# Patient Record
Sex: FEMALE | Race: WHITE | NOT HISPANIC OR LATINO | URBAN - METROPOLITAN AREA
[De-identification: names, ages, dates, MRNs, and addresses within clinical notes are randomized per-mention and may not be internally consistent; named-entity substitution may affect disease eponyms.]

---

## 2017-10-30 ENCOUNTER — INPATIENT (INPATIENT)
Facility: HOSPITAL | Age: 32
LOS: 1 days | Discharge: HOME | DRG: 310 | End: 2017-11-01
Attending: FAMILY MEDICINE | Admitting: FAMILY MEDICINE
Payer: COMMERCIAL

## 2017-10-30 VITALS
HEART RATE: 126 BPM | SYSTOLIC BLOOD PRESSURE: 163 MMHG | RESPIRATION RATE: 16 BRPM | TEMPERATURE: 99 F | DIASTOLIC BLOOD PRESSURE: 94 MMHG | OXYGEN SATURATION: 99 % | WEIGHT: 134.92 LBS

## 2017-10-30 DIAGNOSIS — R00.2 PALPITATIONS: ICD-10-CM

## 2017-10-30 DIAGNOSIS — R00.0 TACHYCARDIA, UNSPECIFIED: ICD-10-CM

## 2017-10-30 DIAGNOSIS — R53.1 WEAKNESS: ICD-10-CM

## 2017-10-30 DIAGNOSIS — Z29.9 ENCOUNTER FOR PROPHYLACTIC MEASURES, UNSPECIFIED: ICD-10-CM

## 2017-10-30 LAB
ALBUMIN SERPL ELPH-MCNC: 4.2 G/DL — SIGNIFICANT CHANGE UP (ref 3.3–5)
ALP SERPL-CCNC: 68 U/L — SIGNIFICANT CHANGE UP (ref 40–120)
ALT FLD-CCNC: 30 U/L — SIGNIFICANT CHANGE UP (ref 12–78)
ANION GAP SERPL CALC-SCNC: 11 MMOL/L — SIGNIFICANT CHANGE UP (ref 5–17)
APPEARANCE UR: ABNORMAL
APTT BLD: 26.2 SEC — SIGNIFICANT CHANGE UP (ref 27.5–37.4)
AST SERPL-CCNC: 16 U/L — SIGNIFICANT CHANGE UP (ref 15–37)
BACTERIA # UR AUTO: ABNORMAL
BASOPHILS # BLD AUTO: 0.1 K/UL — SIGNIFICANT CHANGE UP (ref 0–0.2)
BASOPHILS NFR BLD AUTO: 1.2 % — SIGNIFICANT CHANGE UP (ref 0–2)
BILIRUB SERPL-MCNC: 0.2 MG/DL — SIGNIFICANT CHANGE UP (ref 0.2–1.2)
BILIRUB UR-MCNC: NEGATIVE — SIGNIFICANT CHANGE UP
BUN SERPL-MCNC: 11 MG/DL — SIGNIFICANT CHANGE UP (ref 7–23)
CALCIUM SERPL-MCNC: 9.4 MG/DL — SIGNIFICANT CHANGE UP (ref 8.5–10.1)
CHLORIDE SERPL-SCNC: 105 MMOL/L — SIGNIFICANT CHANGE UP (ref 96–108)
CK MB BLD-MCNC: <0.7 % — SIGNIFICANT CHANGE UP (ref 0–3.5)
CK MB CFR SERPL CALC: <0.5 NG/ML — SIGNIFICANT CHANGE UP (ref 0–3.6)
CK SERPL-CCNC: 67 U/L — SIGNIFICANT CHANGE UP (ref 26–192)
CO2 SERPL-SCNC: 25 MMOL/L — SIGNIFICANT CHANGE UP (ref 22–31)
COLOR SPEC: YELLOW — SIGNIFICANT CHANGE UP
COMMENT - URINE: SIGNIFICANT CHANGE UP
CREAT SERPL-MCNC: 0.88 MG/DL — SIGNIFICANT CHANGE UP (ref 0.5–1.3)
D DIMER BLD IA.RAPID-MCNC: <150 NG/ML DDU — SIGNIFICANT CHANGE UP
DIFF PNL FLD: ABNORMAL
EOSINOPHIL # BLD AUTO: 0.1 K/UL — SIGNIFICANT CHANGE UP (ref 0–0.5)
EOSINOPHIL NFR BLD AUTO: 1.8 % — SIGNIFICANT CHANGE UP (ref 0–6)
EPI CELLS # UR: SIGNIFICANT CHANGE UP
GLUCOSE SERPL-MCNC: 112 MG/DL — HIGH (ref 70–99)
GLUCOSE UR QL: NEGATIVE — SIGNIFICANT CHANGE UP
HCG SERPL-ACNC: <1 MIU/ML — SIGNIFICANT CHANGE UP
HCT VFR BLD CALC: 45.9 % — HIGH (ref 34.5–45)
HGB BLD-MCNC: 14.6 G/DL — SIGNIFICANT CHANGE UP (ref 11.5–15.5)
INR BLD: 1.03 RATIO — SIGNIFICANT CHANGE UP (ref 0.88–1.16)
KETONES UR-MCNC: NEGATIVE — SIGNIFICANT CHANGE UP
LEUKOCYTE ESTERASE UR-ACNC: ABNORMAL
LIDOCAIN IGE QN: 199 U/L — SIGNIFICANT CHANGE UP (ref 73–393)
LYMPHOCYTES # BLD AUTO: 0.9 K/UL — LOW (ref 1–3.3)
LYMPHOCYTES # BLD AUTO: 10.9 % — LOW (ref 13–44)
MCHC RBC-ENTMCNC: 30.1 PG — SIGNIFICANT CHANGE UP (ref 27–34)
MCHC RBC-ENTMCNC: 31.9 GM/DL — LOW (ref 32–36)
MCV RBC AUTO: 94.3 FL — SIGNIFICANT CHANGE UP (ref 80–100)
MONOCYTES # BLD AUTO: 0.4 K/UL — SIGNIFICANT CHANGE UP (ref 0–0.9)
MONOCYTES NFR BLD AUTO: 5.1 % — SIGNIFICANT CHANGE UP (ref 1–9)
NEUTROPHILS # BLD AUTO: 6.7 K/UL — SIGNIFICANT CHANGE UP (ref 1.8–7.4)
NEUTROPHILS NFR BLD AUTO: 81 % — HIGH (ref 43–77)
NITRITE UR-MCNC: NEGATIVE — SIGNIFICANT CHANGE UP
PCP SPEC-MCNC: SIGNIFICANT CHANGE UP
PH UR: 8 — SIGNIFICANT CHANGE UP (ref 5–8)
PLATELET # BLD AUTO: 344 K/UL — SIGNIFICANT CHANGE UP (ref 150–400)
POTASSIUM SERPL-MCNC: 3.8 MMOL/L — SIGNIFICANT CHANGE UP (ref 3.5–5.3)
POTASSIUM SERPL-SCNC: 3.8 MMOL/L — SIGNIFICANT CHANGE UP (ref 3.5–5.3)
PROT SERPL-MCNC: 8.4 G/DL — HIGH (ref 6–8.3)
PROT UR-MCNC: NEGATIVE — SIGNIFICANT CHANGE UP
PROTHROM AB SERPL-ACNC: 11.2 SEC — SIGNIFICANT CHANGE UP (ref 9.8–12.7)
RBC # BLD: 4.86 M/UL — SIGNIFICANT CHANGE UP (ref 3.8–5.2)
RBC # FLD: 11.9 % — SIGNIFICANT CHANGE UP (ref 10.3–14.5)
RBC CASTS # UR COMP ASSIST: ABNORMAL /HPF (ref 0–4)
SODIUM SERPL-SCNC: 141 MMOL/L — SIGNIFICANT CHANGE UP (ref 135–145)
SP GR SPEC: 1.01 — SIGNIFICANT CHANGE UP (ref 1.01–1.02)
TROPONIN I SERPL-MCNC: <.015 NG/ML — SIGNIFICANT CHANGE UP (ref 0.01–0.04)
UROBILINOGEN FLD QL: NEGATIVE — SIGNIFICANT CHANGE UP
WBC # BLD: 8.2 K/UL — SIGNIFICANT CHANGE UP (ref 3.8–10.5)
WBC # FLD AUTO: 8.2 K/UL — SIGNIFICANT CHANGE UP (ref 3.8–10.5)
WBC UR QL: ABNORMAL

## 2017-10-30 PROCEDURE — 71010: CPT | Mod: 26

## 2017-10-30 PROCEDURE — 99223 1ST HOSP IP/OBS HIGH 75: CPT | Mod: AI,GC

## 2017-10-30 PROCEDURE — 99255 IP/OBS CONSLTJ NEW/EST HI 80: CPT

## 2017-10-30 PROCEDURE — 93010 ELECTROCARDIOGRAM REPORT: CPT

## 2017-10-30 PROCEDURE — 99285 EMERGENCY DEPT VISIT HI MDM: CPT

## 2017-10-30 RX ORDER — ONDANSETRON 8 MG/1
4 TABLET, FILM COATED ORAL ONCE
Qty: 0 | Refills: 0 | Status: COMPLETED | OUTPATIENT
Start: 2017-10-30 | End: 2017-10-30

## 2017-10-30 RX ORDER — ALPRAZOLAM 0.25 MG
0.5 TABLET ORAL AT BEDTIME
Qty: 0 | Refills: 0 | Status: DISCONTINUED | OUTPATIENT
Start: 2017-10-30 | End: 2017-11-01

## 2017-10-30 RX ORDER — SODIUM CHLORIDE 9 MG/ML
1000 INJECTION INTRAMUSCULAR; INTRAVENOUS; SUBCUTANEOUS
Qty: 0 | Refills: 0 | Status: DISCONTINUED | OUTPATIENT
Start: 2017-10-30 | End: 2017-11-01

## 2017-10-30 RX ORDER — SODIUM CHLORIDE 9 MG/ML
1000 INJECTION INTRAMUSCULAR; INTRAVENOUS; SUBCUTANEOUS ONCE
Qty: 0 | Refills: 0 | Status: COMPLETED | OUTPATIENT
Start: 2017-10-30 | End: 2017-10-30

## 2017-10-30 RX ORDER — ENOXAPARIN SODIUM 100 MG/ML
40 INJECTION SUBCUTANEOUS EVERY 24 HOURS
Qty: 0 | Refills: 0 | Status: DISCONTINUED | OUTPATIENT
Start: 2017-10-30 | End: 2017-11-01

## 2017-10-30 RX ADMIN — ONDANSETRON 4 MILLIGRAM(S): 8 TABLET, FILM COATED ORAL at 18:45

## 2017-10-30 RX ADMIN — SODIUM CHLORIDE 100 MILLILITER(S): 9 INJECTION INTRAMUSCULAR; INTRAVENOUS; SUBCUTANEOUS at 22:59

## 2017-10-30 RX ADMIN — SODIUM CHLORIDE 1000 MILLILITER(S): 9 INJECTION INTRAMUSCULAR; INTRAVENOUS; SUBCUTANEOUS at 16:05

## 2017-10-30 RX ADMIN — Medication 0.5 MILLIGRAM(S): at 22:59

## 2017-10-30 RX ADMIN — SODIUM CHLORIDE 1000 MILLILITER(S): 9 INJECTION INTRAMUSCULAR; INTRAVENOUS; SUBCUTANEOUS at 18:01

## 2017-10-30 NOTE — H&P ADULT - NEGATIVE ENMT SYMPTOMS
no nasal discharge/no tinnitus/no sinus symptoms/no nasal congestion/no nasal obstruction/no vertigo/no ear pain

## 2017-10-30 NOTE — H&P ADULT - NSHPSOCIALHISTORY_GEN_ALL_CORE
Lives with three roomates.   Works at insurance company, concurrently going to school.  Binge drinks on weekends, but not on weekdays. Denies tobacco use. Denies illicit drug use.  Sexually active with one male partner.

## 2017-10-30 NOTE — CONSULT NOTE ADULT - SUBJECTIVE AND OBJECTIVE BOX
[facility] - Deneen Whitehead, Fuentes, Mary Jo, Bess, Christianne Bustillo  Office Number: [phone]    Initial Consult Note    CHIEF COMPLAINT: Patient is a 32y old  Female who presents with a chief complaint of tachycardia.    HPI: This is a 32 year old woman with a history of anxiety who was sent to the ED by her PMD for tachycardia.  She has been feeling unwell for weeks.  She feels like her heart is racing out of her chest.  She reports that it is exacerbated by sitting up and changing positions.  She has also felt generalized fatigue and malaise, weakness, near syncope, fevers, chills, and diaphoretic.  She visited an urgent care facility a week ago, and blood work was negative, including a TSH, EBV, and a Lyme titer.  She did not feel better, and saw her PMD today, and was sent to the ED.  She denies chest pain, increased dyspnea, PND, orthopnea, or LE swelling.  She is not using excessive caffieine, stimulants, or illicit drugs.  She is not on any herbal supplements.  She does not have a cardiac history, and has never seen a cardiologist        PAST MEDICAL & SURGICAL HISTORY:  Anxiety      SOCIAL HISTORY:  No tobacco, ethanol, or drug abuse.    FAMILY HISTORY:  No family history of acute MI or sudden cardiac death.    MEDICATIONS  (STANDING):  sodium chloride 0.9% Bolus 1000 milliLiter(s) IV Bolus once    MEDICATIONS  (PRN):      Allergies    No Known Allergies    Intolerances        REVIEW OF SYSTEMS:    All other review of systems is negative unless indicated above    VITAL SIGNS:   Vital Signs Last 24 Hrs  T(C): 37.1 (30 Oct 2017 14:53), Max: 37.1 (30 Oct 2017 14:53)  T(F): 98.8 (30 Oct 2017 14:53), Max: 98.8 (30 Oct 2017 14:53)  HR: 126 (30 Oct 2017 14:53) (126 - 126)  BP: 163/94 (30 Oct 2017 14:53) (163/94 - 163/94)  BP(mean): --  RR: 16 (30 Oct 2017 14:53) (16 - 16)  SpO2: 99% (30 Oct 2017 14:53) (99% - 99%)    I&O's Summary      On Exam:    Constitutional: NAD, alert and oriented x 3  Lungs:  Non-labored, breath sounds are clear bilaterally, No wheezing, rales or rhonchi  Cardiovascular: RRR.  S1 and S2 positive.  No murmurs, rubs, gallops or clicks. Tachycardia  Gastrointestinal: Bowel Sounds present, soft, nontender.   Lymph: No peripheral edema. No cervical lymphadenopathy.  Neurological: Alert, no focal deficits  Skin: No rashes or ulcers   Psych:  Mood & affect appropriate.    LABS: All Labs Reviewed:                        14.6   8.2   )-----------( 344      ( 30 Oct 2017 15:24 )             45.9     30 Oct 2017 15:24    141    |  105    |  11     ----------------------------<  112    3.8     |  25     |  0.88     Ca    9.4        30 Oct 2017 15:24    TPro  8.4    /  Alb  4.2    /  TBili  0.2    /  DBili  x      /  AST  16     /  ALT  30     /  AlkPhos  68     30 Oct 2017 15:24      CARDIAC MARKERS ( 30 Oct 2017 15:24 )  <.015 ng/mL / x     / 67 U/L / x     / <0.5 ng/mL          RADIOLOGY:    EKG:  SInus tachycardia.

## 2017-10-30 NOTE — H&P ADULT - HISTORY OF PRESENT ILLNESS
32 year old female with no PMHx here for fatigue and palpitations. As per pt, for the past week and a half she has been feeling increasingly fatigued and nauseous. She has been eating less because she is afraid that she might vomit. She denies any episodes of vomiting as of yet. She also reports that yesterday she started feeling palpitations. She said her she can feel her heart racing.  She reports that this has occurred in the past when she has periods of her life when she is stressed or anxious. She admits that currently she has been feeling more stressed that usual with work and school, as she is taking a new course she has to pass. She also reports that she felt like she had a fever last week, and has been having chills on and off since that time. She did not measure her temperature at any point. She also admits to multiple sick contacts. One of her roommates was hospitalized with mononucleosis last week, and another currently has a cold. She franchesca any chest pain, SOB, recent illnesses, dysuria, recent travel, syncopal episodes, or confusion. Denies any new sexual partners.     In ED, pt recieved 1L NS bolus x2, Zofran 4mg x1. Labs were significant for normal WBC count but with 81% neutrophils, negative D-dimer, negative trops, and negative urine tox. UA showed moderate bacteria with trace blood and LKE. CXR showed normal chest. EKG showed sinus tachycardia.   Pt. was seen by cardio (Dr. Kellogg). 32 year old female with no PMHx here for fatigue and palpitations. As per pt, for the past week and a half she has been feeling increasingly fatigued and nauseous. She has been eating less because she is afraid that she might vomit. She denies any episodes of vomiting as of yet. She also reports that yesterday she started feeling palpitations. She said her she can feel her heart racing.  She reports that this has occurred in the past when she has periods of her life when she is stressed or anxious. She admits that currently she has been feeling more stressed that usual with work and school, as she is taking a new course she has to pass. She also reports that she felt like she had a fever last week, and has been having chills on and off since that time. She did not measure her temperature at any point. She also admits to multiple sick contacts. One of her roommates was hospitalized with mononucleosis last week, and another currently has a cold. She denies any chest pain, SOB, recent illnesses, dysuria, recent travel, syncopal episodes, or confusion. Denies any new sexual partners.   Of note, pt. took 8mg of Zofran on Friday for nausea. She also admits to using the preworkout supplement "C4" (contains beta-alanine, caffeine, arginine).    In ED, pt recieved 1L NS bolus x2, Zofran 4mg x1. Labs were significant for normal WBC count but with 81% neutrophils, negative D-dimer, negative trops, and negative urine tox. UA showed moderate bacteria with trace blood and LKE. CXR showed normal chest. EKG showed sinus tachycardia.   Pt. was seen by cardio (Dr. Kellogg).

## 2017-10-30 NOTE — CONSULT NOTE ADULT - ASSESSMENT
32 year old woman with anxiety with an unexplained sinus tachycardia:    - Admit to remote telemetry  - Her tachycardia is unlikely the cause of structural heart disease.  Rather, it is likely reactive to some systemic process that is not obvious at the current time  - She is to have an echocardiogram to assess her LV function  - Her TSH was normal  - Check a DD.  If positive, she will undergo CT PA  - Consider an ID consult  - I would hydrate the patient overnight with IVF  - Check a urine tox screen  - To follow with you.  Further work up to depend on clinical course.

## 2017-10-30 NOTE — H&P ADULT - ATTENDING COMMENTS
Cause of sinus tach not entirely clear. Pt had lyme workup done on 10/27 (final results not back yet) which was negative, along with EBV testing. She does admit to taking a nutritional supplement, but has not taken it in two weeks. Will check TFTs and rehydrate. If that does not correct or reveal a cause for her tachycardia must consider worsening anxiety. Pt does admit to worsening stress over the last two weeks. Will defer zofran for now as patient has noticed that this has caused heart pounding in the past. Cause of sinus tach not entirely clear. Pt had lyme workup done on 10/27 (final results not back yet) which was negative, along with EBV testing. She does admit to taking a nutritional supplement, but has not taken it in two weeks. Will check TFTs and rehydrate. If that does not correct or reveal a cause for her tachycardia must consider worsening anxiety. Pt does admit to worsening stress over the last two weeks. Will defer zofran for now as patient has noticed that this has caused heart pounding in the past. Pt's UA does show 11-25 WBC however she has no urinary complaints at this time. Will get urine culture.

## 2017-10-30 NOTE — H&P ADULT - FAMILY HISTORY
No pertinent family history in first degree relatives Father  Still living? Unknown  Family history of hypertension, Age at diagnosis: Age Unknown  Family history of hypothyroidism, Age at diagnosis: Age Unknown  Family history of hyperlipidemia, Age at diagnosis: Age Unknown

## 2017-10-30 NOTE — H&P ADULT - NEGATIVE OPHTHALMOLOGIC SYMPTOMS
no diplopia/no discharge R/no discharge L/no lacrimation L/no lacrimation R/no blurred vision L/no blurred vision R/no photophobia

## 2017-10-30 NOTE — ED ADULT NURSE NOTE - OBJECTIVE STATEMENT
Patient is a and o x3. Placed on Cardiac monitor. No acute distress noted. MSpencer Patient is a and o x3. Placed on Cardiac monitor. No acute distress noted. MSpencer  IV 20G RAC stable condition.MS

## 2017-10-30 NOTE — ED PROVIDER NOTE - PROGRESS NOTE DETAILS
discussed case with Dr. Soler will admit, Dr. Mercado agrees with admission for observation, possible ID consult

## 2017-10-30 NOTE — H&P ADULT - PROBLEM SELECTOR PLAN 1
cardio recs appreciated - admit to remote tele, structural heart disease unlikely  f/u TTE cardio recs appreciated - admit to remote tele, structural heart disease unlikely  f/u TTE  possibly related to underlying infection vs stress/anxiety cardio recs appreciated - admit to remote tele, structural heart disease unlikely  f/u TTE  possibly related to stress/anxiety

## 2017-10-30 NOTE — ED ADULT NURSE REASSESSMENT NOTE - NS ED NURSE REASSESS COMMENT FT1
Assumed care for pt awake alert and oriented x 4, RAIN. Vss, afebrile. sinus tachy on the monitor. awaiting cardiac consult. Remained on the monitor. will continue to monitor.

## 2017-10-30 NOTE — H&P ADULT - RS GEN PE MLT RESP DETAILS PC
clear to auscultation bilaterally/no intercostal retractions/no rhonchi/good air movement/no rales/normal/respirations non-labored/no wheezes/airway patent/breath sounds equal

## 2017-10-30 NOTE — ED PROVIDER NOTE - OBJECTIVE STATEMENT
33 yo female c/o generalized weakness x 1 week with associated nausea, no vomiting, no abdominal pain.  Today felt short of breath, nearly passed out.  BIB family.  No chest pain. 31 yo female c/o generalized weakness x 1 week with associated nausea, no vomiting, no abdominal pain.  Today felt short of breath, nearly passed out.  BIB family.  No chest pain. PMD Adriano Adler, had negative TSH, negative mono test, negative lyme on outpatient labs.

## 2017-10-30 NOTE — H&P ADULT - PROBLEM SELECTOR PLAN 2
admit to tele  EKG shows sinus tachy  may be related to underlying infection process given neutrophil count admit to tele  EKG shows sinus tachy  may be related to underlying infection process given neutrophil count  f/u thyroid studies admit to tele  EKG shows sinus tachy  f/u thyroid studies

## 2017-10-30 NOTE — H&P ADULT - NEUROLOGICAL DETAILS
normal strength/sensation intact/responds to verbal commands/alert and oriented x 3/deep reflexes intact/cranial nerves intact/no spontaneous movement

## 2017-10-30 NOTE — H&P ADULT - MUSCULOSKELETAL
details… detailed exam normal strength/ROM intact/no joint swelling/no joint erythema/no joint warmth/no calf tenderness

## 2017-10-30 NOTE — ED PROVIDER NOTE - CARE PLAN
Principal Discharge DX:	Palpitation  Secondary Diagnosis:	Generalized weakness Principal Discharge DX:	Palpitation  Secondary Diagnosis:	Generalized weakness  Secondary Diagnosis:	Tachycardia

## 2017-10-30 NOTE — H&P ADULT - PROBLEM SELECTOR PLAN 3
possibly related to underlying infection, given neutrophil elevation  consider ID consult possibly related to underlying infection, given neutrophil elevation  consider ID consult  f/u thyroid studies

## 2017-10-30 NOTE — H&P ADULT - NEGATIVE NEUROLOGICAL SYMPTOMS
no generalized seizures/no focal seizures/no paresthesias/no syncope/no tremors/no transient paralysis

## 2017-10-31 ENCOUNTER — TRANSCRIPTION ENCOUNTER (OUTPATIENT)
Age: 32
End: 2017-10-31

## 2017-10-31 LAB
ALBUMIN SERPL ELPH-MCNC: 3.2 G/DL — LOW (ref 3.3–5)
ALP SERPL-CCNC: 51 U/L — SIGNIFICANT CHANGE UP (ref 40–120)
ALT FLD-CCNC: 22 U/L — SIGNIFICANT CHANGE UP (ref 12–78)
ANION GAP SERPL CALC-SCNC: 9 MMOL/L — SIGNIFICANT CHANGE UP (ref 5–17)
AST SERPL-CCNC: 18 U/L — SIGNIFICANT CHANGE UP (ref 15–37)
BASOPHILS # BLD AUTO: 0.1 K/UL — SIGNIFICANT CHANGE UP (ref 0–0.2)
BASOPHILS NFR BLD AUTO: 1.2 % — SIGNIFICANT CHANGE UP (ref 0–2)
BILIRUB SERPL-MCNC: 0.4 MG/DL — SIGNIFICANT CHANGE UP (ref 0.2–1.2)
BUN SERPL-MCNC: 9 MG/DL — SIGNIFICANT CHANGE UP (ref 7–23)
CALCIUM SERPL-MCNC: 8.2 MG/DL — LOW (ref 8.5–10.1)
CHLORIDE SERPL-SCNC: 109 MMOL/L — HIGH (ref 96–108)
CO2 SERPL-SCNC: 24 MMOL/L — SIGNIFICANT CHANGE UP (ref 22–31)
CREAT SERPL-MCNC: 0.77 MG/DL — SIGNIFICANT CHANGE UP (ref 0.5–1.3)
EOSINOPHIL # BLD AUTO: 0.2 K/UL — SIGNIFICANT CHANGE UP (ref 0–0.5)
EOSINOPHIL NFR BLD AUTO: 3.3 % — SIGNIFICANT CHANGE UP (ref 0–6)
GLUCOSE SERPL-MCNC: 88 MG/DL — SIGNIFICANT CHANGE UP (ref 70–99)
HBA1C BLD-MCNC: 5.2 % — SIGNIFICANT CHANGE UP (ref 4–5.6)
HCT VFR BLD CALC: 38.5 % — SIGNIFICANT CHANGE UP (ref 34.5–45)
HGB BLD-MCNC: 12.3 G/DL — SIGNIFICANT CHANGE UP (ref 11.5–15.5)
LYMPHOCYTES # BLD AUTO: 1.5 K/UL — SIGNIFICANT CHANGE UP (ref 1–3.3)
LYMPHOCYTES # BLD AUTO: 21.3 % — SIGNIFICANT CHANGE UP (ref 13–44)
MAGNESIUM SERPL-MCNC: 1.9 MG/DL — SIGNIFICANT CHANGE UP (ref 1.6–2.6)
MCHC RBC-ENTMCNC: 30.1 PG — SIGNIFICANT CHANGE UP (ref 27–34)
MCHC RBC-ENTMCNC: 32 GM/DL — SIGNIFICANT CHANGE UP (ref 32–36)
MCV RBC AUTO: 94.2 FL — SIGNIFICANT CHANGE UP (ref 80–100)
MONOCYTES # BLD AUTO: 0.5 K/UL — SIGNIFICANT CHANGE UP (ref 0–0.9)
MONOCYTES NFR BLD AUTO: 7.2 % — SIGNIFICANT CHANGE UP (ref 1–9)
NEUTROPHILS # BLD AUTO: 4.7 K/UL — SIGNIFICANT CHANGE UP (ref 1.8–7.4)
NEUTROPHILS NFR BLD AUTO: 67.1 % — SIGNIFICANT CHANGE UP (ref 43–77)
PHOSPHATE SERPL-MCNC: 3.3 MG/DL — SIGNIFICANT CHANGE UP (ref 2.5–4.5)
PLATELET # BLD AUTO: 275 K/UL — SIGNIFICANT CHANGE UP (ref 150–400)
POTASSIUM SERPL-MCNC: 3.9 MMOL/L — SIGNIFICANT CHANGE UP (ref 3.5–5.3)
POTASSIUM SERPL-SCNC: 3.9 MMOL/L — SIGNIFICANT CHANGE UP (ref 3.5–5.3)
PROT SERPL-MCNC: 6.4 G/DL — SIGNIFICANT CHANGE UP (ref 6–8.3)
RBC # BLD: 4.08 M/UL — SIGNIFICANT CHANGE UP (ref 3.8–5.2)
RBC # FLD: 11.8 % — SIGNIFICANT CHANGE UP (ref 10.3–14.5)
SODIUM SERPL-SCNC: 142 MMOL/L — SIGNIFICANT CHANGE UP (ref 135–145)
T3 SERPL-MCNC: 182 NG/DL — SIGNIFICANT CHANGE UP (ref 80–200)
T4 AB SER-ACNC: 9.5 UG/DL — SIGNIFICANT CHANGE UP (ref 4.6–12)
TSH SERPL-MCNC: 3.8 UIU/ML — HIGH (ref 0.36–3.74)
WBC # BLD: 7 K/UL — SIGNIFICANT CHANGE UP (ref 3.8–10.5)
WBC # FLD AUTO: 7 K/UL — SIGNIFICANT CHANGE UP (ref 3.8–10.5)

## 2017-10-31 PROCEDURE — 99233 SBSQ HOSP IP/OBS HIGH 50: CPT

## 2017-10-31 PROCEDURE — 99233 SBSQ HOSP IP/OBS HIGH 50: CPT | Mod: GC

## 2017-10-31 PROCEDURE — 93306 TTE W/DOPPLER COMPLETE: CPT | Mod: 26

## 2017-10-31 RX ORDER — ALPRAZOLAM 0.25 MG
0.25 TABLET ORAL ONCE
Qty: 0 | Refills: 0 | Status: DISCONTINUED | OUTPATIENT
Start: 2017-10-31 | End: 2017-10-31

## 2017-10-31 RX ORDER — ACETAMINOPHEN 500 MG
650 TABLET ORAL ONCE
Qty: 0 | Refills: 0 | Status: COMPLETED | OUTPATIENT
Start: 2017-10-31 | End: 2017-10-31

## 2017-10-31 RX ORDER — METOCLOPRAMIDE HCL 10 MG
1 TABLET ORAL
Qty: 21 | Refills: 0 | OUTPATIENT
Start: 2017-10-31 | End: 2017-11-07

## 2017-10-31 RX ORDER — METOCLOPRAMIDE HCL 10 MG
10 TABLET ORAL ONCE
Qty: 0 | Refills: 0 | Status: COMPLETED | OUTPATIENT
Start: 2017-10-31 | End: 2017-10-31

## 2017-10-31 RX ORDER — METOPROLOL TARTRATE 50 MG
25 TABLET ORAL DAILY
Qty: 0 | Refills: 0 | Status: DISCONTINUED | OUTPATIENT
Start: 2017-10-31 | End: 2017-11-01

## 2017-10-31 RX ORDER — METOPROLOL TARTRATE 50 MG
1 TABLET ORAL
Qty: 7 | Refills: 0 | OUTPATIENT
Start: 2017-10-31 | End: 2017-11-07

## 2017-10-31 RX ADMIN — Medication 10 MILLIGRAM(S): at 13:44

## 2017-10-31 RX ADMIN — Medication 650 MILLIGRAM(S): at 02:01

## 2017-10-31 RX ADMIN — Medication 650 MILLIGRAM(S): at 02:50

## 2017-10-31 RX ADMIN — Medication 0.25 MILLIGRAM(S): at 21:19

## 2017-10-31 RX ADMIN — Medication 25 MILLIGRAM(S): at 17:50

## 2017-10-31 RX ADMIN — Medication 0.5 MILLIGRAM(S): at 16:50

## 2017-10-31 NOTE — DISCHARGE NOTE ADULT - ADDITIONAL INSTRUCTIONS
pmd dr kathy corona / notified office  fu with in 1wk / fu cardio dr Dill  Friday in office . repeat gastric emptying study for hx gastroparesis out pt . fu out pt tsh 2 wk. pmd dr kathy corona / notified office  fu with in 1wk / fu cardio dr Dill  Friday in office for possible Holter monitor and/or electrophysiology study . repeat gastric emptying study for hx gastroparesis out pt . follow up with your PCP within 1 week of discharge.  Please also follow up with your current psychiatrist or a new psychiatrist within 1-2 weeks of discharge.

## 2017-10-31 NOTE — PROGRESS NOTE ADULT - ASSESSMENT
32 year old woman with anxiety with an unexplained sinus tachycardia. Possible viral syndrome. Structurally normal heart by echo    Recommend:  _ ambulate and consider D/C home  - F/U in office  - Her tachycardia is unlikely the cause of structural heart disease.  Rather, it is likely reactive to some systemic process that is not obvious at the current time  - She is to have an echocardiogram to assess her LV function  - Her TSH was normal  - Consider an ID consult  - I would hydrate the patient overnight with IVF  - To follow with you.  Further work up to depend on clinical course.

## 2017-10-31 NOTE — DISCHARGE NOTE ADULT - HOSPITAL COURSE
32 year old female with no PMHx here for fatigue and palpitations. As per pt, for the past week and a half she has been feeling increasingly fatigued and nauseous. She has been eating less because she is afraid that she might vomit. She denies any episodes of vomiting as of yet. She also reports that yesterday she started feeling palpitations. She said her she can feel her heart racing.  She reports that this has occurred in the past when she has periods of her life when she is stressed or anxious. She admits that currently she has been feeling more stressed that usual with work and school, as she is taking a new course she has to pass. She also reports that she felt like she had a fever last week, and has been having chills on and off since that time. She did not measure her temperature at any point. She also admits to multiple sick contacts. One of her roommates was hospitalized with mononucleosis last week, and another currently has a cold. She denies any chest pain, SOB, recent illnesses, dysuria, recent travel, syncopal episodes, or confusion. Denies any new sexual partners.   Of note, pt. took 8mg of Zofran on Friday for nausea. She also admits to using the preworkout supplement "C4" (contains beta-alanine, caffeine, arginine).    In ED, pt recieved 1L NS bolus x2, Zofran 4mg x1. Labs were significant for normal WBC count but with 81% neutrophils, negative D-dimer, negative trops, and negative urine tox. UA showed moderate bacteria with trace blood and LKE. CXR showed normal chest. EKG showed sinus tachycardia.   Pt. was seen by cardio (Dr. Kellogg).     Pt was admitted to telemetry and monitored continuously.  No abnormalities were noted while at rest or while ambulating.  Echo was performed, showing no abnormalities.      Pt was given reglan for nausea with good result.  Prior to admission she was able to tolerate PO intake and ambulate without incident.     Patient was medically optimized and improved clinically throughout hospital course. Patient seen and examined on day of discharge.  She had no complaints and verbalized readiness to go home.  She was counseled regarding outpatient follow up and to return to ED if symptoms worsen.    Vital Signs Last 24 Hrs  T(C): 36.9 (31 Oct 2017 12:07), Max: 37.6 (30 Oct 2017 20:05)  T(F): 98.5 (31 Oct 2017 12:07), Max: 99.6 (30 Oct 2017 20:05)  HR: 95 (31 Oct 2017 12:07) (69 - 126)  BP: 112/70 (31 Oct 2017 12:07) (102/69 - 163/94)  BP(mean): --  RR: 19 (31 Oct 2017 12:07) (16 - 22)  SpO2: 100% (31 Oct 2017 12:07) (98% - 100%)    Physical Exam:  General: Well developed, well nourished, NAD  HEENT: NCAT, PERRLA, EOMI bl, moist mucous membranes   Neck: Supple, nontender, no mass  Neurology: A&Ox3, nonfocal, CN II-XII grossly intact, sensation intact, no gait abnormalities   Respiratory: CTA B/L, No W/R/R  CV: RRR, +S1/S2, no murmurs, rubs or gallops  Abdominal: Soft, NT, ND +BSx4  Extremities: No C/C/E, + peripheral pulses  MSK: Normal ROM, no joint erythema or warmth, no joint swelling   Skin: warm, dry, normal color, no rash or abnormal lesions    Patient is medically stable and cleared for discharge to home with outpatient follow up. 32 year old female with no PMHx here for fatigue and palpitations. As per pt, for the past week and a half she has been feeling increasingly fatigued and nauseous. She has been eating less because she is afraid that she might vomit. She denies any episodes of vomiting as of yet. She also reports that yesterday she started feeling palpitations. She said her she can feel her heart racing.  She reports that this has occurred in the past when she has periods of her life when she is stressed or anxious. She admits that currently she has been feeling more stressed that usual with work and school, as she is taking a new course she has to pass. She also reports that she felt like she had a fever last week, and has been having chills on and off since that time. She did not measure her temperature at any point. She also admits to multiple sick contacts. One of her roommates was hospitalized with mononucleosis last week, and another currently has a cold. She denies any chest pain, SOB, recent illnesses, dysuria, recent travel, syncopal episodes, or confusion. Denies any new sexual partners.   Of note, pt. took 8mg of Zofran on Friday for nausea. She also admits to using the preworkout supplement "C4" (contains beta-alanine, caffeine, arginine).    In ED, pt recieved 1L NS bolus x2, Zofran 4mg x1. Labs were significant for normal WBC count but with 81% neutrophils, negative D-dimer, negative trops, and negative urine tox. UA showed moderate bacteria with trace blood and LKE. CXR showed normal chest. EKG showed sinus tachycardia.   admitted for fatigue and palpitations.   Palpitation.cardio recs appreciated - admit to remote tele, structural heart disease unlikely    Pt. was seen by cardio (Dr. Kellogg).     Pt was telemetry monitored continuously.  No abnormalities sinus tachy   were noted while at rest or while ambulating  possible stress anxiety related this time.  Echo was performed, showing no abnormalities.      Pt was given raglan for nausea  as does have hx gastroparesis in past  .    Patient was medically optimized and improved clinically throughout hospital course cardio dr Dill clear pt to be dc / as no sign and symptom of infection this time weakness possible viral syndrome  . Patient seen and examined on day of discharge.  She had no complaints and verbalized readiness to go home.  She was counseled regarding outpatient follow up . physical at day of dc   10-31-17   Vital Signs Last 24 Hrs  T(C): 36.9 (31 Oct 2017 12:07), Max: 37.6 (30 Oct 2017 20:05)  T(F): 98.5 (31 Oct 2017 12:07), Max: 99.6 (30 Oct 2017 20:05)  HR: 95 (31 Oct 2017 12:07) (69 - 126)  BP: 112/70 (31 Oct 2017 12:07) (102/69 - 163/94)  BP(mean): --  RR: 19 (31 Oct 2017 12:07) (16 - 22)  SpO2: 100% (31 Oct 2017 12:07) (98% - 100%)    Physical Exam:  General: Well developed, well nourished, NAD  HEENT: NCAT, PERRLA, EOMI bl, moist mucous membranes   Neck: Supple, nontender, no mass  Neurology: A&Ox3, nonfocal, CN II-XII grossly intact, sensation intact, no gait abnormalities   Respiratory: CTA B/L, No W/R/R  CV: RRR, +S1/S2, no murmurs,   Abdominal: Soft, NT, ND +BSx4  Extremities: No C/C/E, + peripheral pulses  MSK: Normal ROM, no joint erythema or warmth, no joint swelling   Skin: warm, dry, normal color, no rash or abnormal lesions    Patient is medically stable and cleared for discharge to home with outpatient follow up / pmd dr kathy corona office notified dc plan . 32 year old female with no PMHx here for fatigue and palpitations. As per pt, for the past week and a half she has been feeling increasingly fatigued and nauseous. She has been eating less because she is afraid that she might vomit. She denies any episodes of vomiting as of yet. She also reports that yesterday she started feeling palpitations. She said her she can feel her heart racing.  She reports that this has occurred in the past when she has periods of her life when she is stressed or anxious. She admits that currently she has been feeling more stressed that usual with work and school, as she is taking a new course she has to pass. She also reports that she felt like she had a fever last week, and has been having chills on and off since that time. She did not measure her temperature at any point. She also admits to multiple sick contacts. One of her roommates was hospitalized with mononucleosis last week, and another currently has a cold. She denies any chest pain, SOB, recent illnesses, dysuria, recent travel, syncopal episodes, or confusion. Denies any new sexual partners.   Of note, pt. took 8mg of Zofran on Friday for nausea. She also admits to using the preworkout supplement "C4" (contains beta-alanine, caffeine, arginine).    In ED, pt recieved 1L NS bolus x2, Zofran 4mg x1. Labs were significant for normal WBC count but with 81% neutrophils, negative D-dimer, negative trops, and negative urine tox. UA showed moderate bacteria with trace blood and LKE. CXR showed normal chest. EKG showed sinus tachycardia.   admitted for fatigue and palpitations.   Palpitation.cardio recs appreciated - admit to remote tele, structural heart disease unlikely    Pt. was seen by cardio (Dr. Kellogg).     Pt was telemetry monitored continuously.  No abnormalities sinus tachy   were noted while at rest or while ambulating  possible stress anxiety related this time.  Echo was performed, showing no abnormalities.      Pt was given reglan for nausea  as does have hx gastroparesis in past  .    Patient was medically optimized and improved clinically throughout hospital course cardio dr Dill clear pt to be dc / as no sign and symptom of infection this time weakness possible viral syndrome.  Patient seen and examined on day of discharge.  She continued to have episodes of unsustained sinus tachycardia to the 160s for sec .  Cardiology (Dr. Dill) was made aware and after examining telemetry records opted to place the pt on metoprolol and discharge the patient with a prescription for this for palpitation .  Pt was counseled regarding outpatient follow up.      Physical on day of discharge:  10-31-17   Vital Signs Last 24 Hrs  T(C): 36.9 (31 Oct 2017 12:07), Max: 37.6 (30 Oct 2017 20:05)  T(F): 98.5 (31 Oct 2017 12:07), Max: 99.6 (30 Oct 2017 20:05)  HR: 95 (31 Oct 2017 12:07) (69 - 126)  BP: 112/70 (31 Oct 2017 12:07) (102/69 - 163/94)  BP(mean): --  RR: 19 (31 Oct 2017 12:07) (16 - 22)  SpO2: 100% (31 Oct 2017 12:07) (98% - 100%)    Physical Exam:  General: Well developed, well nourished, NAD  HEENT: NCAT, PERRLA, EOMI bl, moist mucous membranes   Neck: Supple, nontender, no mass  Neurology: A&Ox3, nonfocal, CN II-XII grossly intact, sensation intact, no gait abnormalities   Respiratory: CTA B/L, No W/R/R  CV: RRR, +S1/S2, no murmurs,   Abdominal: Soft, NT, ND +BSx4  Extremities: No C/C/E, + peripheral pulses  MSK: Normal ROM, no joint erythema or warmth, no joint swelling   Skin: warm, dry, normal color, no rash or abnormal lesions    Patient is medically stable and cleared for discharge to home with outpatient follow up / pmd dr kathy corona office notified dc plan . 32 year old female with no PMHx here for fatigue and palpitations. As per pt, for the past week and a half she has been feeling increasingly fatigued and nauseous. She has been eating less because she is afraid that she might vomit. She denies any episodes of vomiting as of yet. She also reports that yesterday she started feeling palpitations. She said her she can feel her heart racing.  She reports that this has occurred in the past when she has periods of her life when she is stressed or anxious. She admits that currently she has been feeling more stressed that usual with work and school, as she is taking a new course she has to pass. She also reports that she felt like she had a fever last week, and has been having chills on and off since that time. She did not measure her temperature at any point. She also admits to multiple sick contacts. One of her roommates was hospitalized with mononucleosis last week, and another currently has a cold. She denies any chest pain, SOB, recent illnesses, dysuria, recent travel, syncopal episodes, or confusion. Denies any new sexual partners.   Of note, pt. took 8mg of Zofran on Friday for nausea. She also admits to using the preworkout supplement "C4" (contains beta-alanine, caffeine, arginine).    In ED, pt recieved 1L NS bolus x2, Zofran 4mg x1. Labs were significant for normal WBC count but with 81% neutrophils, negative D-dimer, negative trops, and negative urine tox. UA showed moderate bacteria with trace blood and LKE. CXR showed normal chest. EKG showed sinus tachycardia.   admitted for fatigue and palpitations.   Palpitation.cardio recs appreciated - admit to remote tele, structural heart disease unlikely    Pt. was seen by cardio (Dr. Kellogg).     Pt was telemetry monitored continuously.  No abnormalities sinus tachy   were noted while at rest or while ambulating  possible stress anxiety related this time.  Echo was performed, showing no abnormalities.      Pt was given reglan for nausea  as does have hx gastroparesis in past  .    Patient was medically optimized and improved clinically throughout hospital course  as no sign and symptom of infection this time weakness possible viral syndrome.  Pt palpitation better , no distress  .  Cardiology (Dr. Faust  was made aware and after examining telemetry records opted to place the pt on metoprolol and discharge the patient with a prescription for this for palpitation .   pt have no orthostatic change of bp ,  pt recommend to increase oral hydration . Pt was counseled regarding outpatient follow up / psychiatrist for hx anxiety dis .      Physical on day of discharge:  11-1-17 day of dc physical  Vital Signs Last 24 Hrs  T(C): 37.4 (01 Nov 2017 11:13), Max: 37.4 (01 Nov 2017 11:13)  T(F): 99.3 (01 Nov 2017 11:13), Max: 99.3 (01 Nov 2017 11:13)  HR: 81 (01 Nov 2017 11:13) (81 - 92)  BP: 121/81 (01 Nov 2017 07:25) (98/64 - 121/81)  BP(mean): --  RR: 18 (01 Nov 2017 11:13) (16 - 18)  SpO2: 99% (01 Nov 2017 11:13) (98% - 100%)    Physical Exam:  General: Well developed, well nourished, NAD  HEENT: NCAT, PERRLA, EOMI bl, moist mucous membranes   Neck: Supple, nontender, no mass  Neurology: A&Ox3, nonfocal, CN II-XII grossly intact, sensation intact, no gait abnormalities   Respiratory: CTA B/L, No W/R/R  CV: RRR, +S1/S2, no murmurs, no tachy   Abdominal: Soft, NT, ND +BSx4  Extremities: No C/C/E, + peripheral pulses  MSK: Normal ROM, no joint erythema or warmth, no joint swelling   Skin: warm, dry, normal color, no rash or abnormal lesions    Patient is medically stable and cleared for discharge to home with outpatient follow up / pmd dr kathy corona office notified dc plan .

## 2017-10-31 NOTE — PROGRESS NOTE ADULT - PROBLEM SELECTOR PLAN 1
-Per cardio Unlikely due to structural heart disease. EKG and echo were normal. Cardio consulted and recommended to hydrate the patient overnight with IVF.  -TSH levels WNL.   -Patient admits to being anxious, takes Xanax at home, provides relief  -Cardio started on metoprolol 25 daily  -continue to monitor on tele -Per cardio Unlikely due to structural heart disease. EKG and echo were normal. Cardio consulted and recommended to hydrate the patient overnight with IVF.   -TSH levels very mild elevated   -Patient admits to being anxious, takes Xanax at home, provides relief  -Cardio started on metoprolol 25 daily  -continue to monitor on tele

## 2017-10-31 NOTE — PROGRESS NOTE ADULT - ATTENDING COMMENTS
pt seen and examine see above - with palpitation sec to sinus tachy sec to possible anxiety dis on xanax and bb , cardio cont  cardiac monitor dc plan in am / family bed side aware with plan .

## 2017-10-31 NOTE — DISCHARGE NOTE ADULT - CARE PLAN
Principal Discharge DX:	Tachycardia  Goal:	heart rate control  Instructions for follow-up, activity and diet:	Please continue to drink plenty of fluids and increase oral intake as tolerated.  May take Reglan as needed to help decrease nausea and increase to oral intake.  Please follow up with cardiology (Dr. Dill) on Friday or Monday after discharge.  Secondary Diagnosis:	Palpitation  Instructions for follow-up, activity and diet:	Please follow up with cardiology as instructed above.  Secondary Diagnosis:	Generalized weakness  Instructions for follow-up, activity and diet:	Please follow up with cardiology as instructed above.  Please also follow up with your primary care physician within 1 week of discharge. Principal Discharge DX:	Tachycardia  Goal:	heart rate controlled  Instructions for follow-up, activity and diet:	Please continue to drink plenty of fluids and increase oral intake as tolerated.  May take Reglan as needed to help decrease nausea  sec to hx gastroparesis repeat study out pt  and increase to oral intake.  Please follow up with cardiology (Dr. Dill) on Friday after dc.  Secondary Diagnosis:	Palpitation  Goal:	resolved/ avoid coffee  Instructions for follow-up, activity and diet:	Please follow up with cardiology as instructed above.  Secondary Diagnosis:	Generalized weakness  Goal:	resolved / possible viral syndrome  Instructions for follow-up, activity and diet:	Please follow up with cardiology as instructed above.  Please also follow up with your primary care physician within 1 week of discharge.

## 2017-10-31 NOTE — PROGRESS NOTE ADULT - PROBLEM SELECTOR PLAN 2
-Improving, possibly due to underlying infection given neutrophil elevation, no leukocytosis, afebrile, negative UA, negative CXR, possibly viral illness possible viral syndrome / no fever , cbc wnl

## 2017-10-31 NOTE — DISCHARGE NOTE ADULT - CARE PROVIDER_API CALL
Adriano Adler (), Internal Medicine  Internal Medicine  [address] [place] [address]  Phone: [phone]  Fax: [phone]    Sher Dill), Cardiovascular Disease  [address] [place] [address]  Phone: [phone]  Fax: [phone]

## 2017-10-31 NOTE — PROGRESS NOTE ADULT - SUBJECTIVE AND OBJECTIVE BOX
Follow up: palps, tachycardia, dizziness    HPI:  32 year old female with no PMHx here for fatigue and palpitations. As per pt, for the past week and a half she has been feeling increasingly fatigued and nauseous. She has been eating less because she is afraid that she might vomit. She denies any episodes of vomiting as of yet. She also reports that yesterday she started feeling palpitations. She said her she can feel her heart racing.  She reports that this has occurred in the past when she has periods of her life when she is stressed or anxious. She admits that currently she has been feeling more stressed that usual with work and school, as she is taking a new course she has to pass. She also reports that she felt like she had a fever last week, and has been having chills on and off since that time. She did not measure her temperature at any point. She also admits to multiple sick contacts. One of her roommates was hospitalized with mononucleosis last week, and another currently has a cold. She denies any chest pain, SOB, recent illnesses, dysuria, recent travel, syncopal episodes, or confusion. Denies any new sexual partners.   Of note, pt. took 8mg of Zofran on Friday for nausea. She also admits to using the preworkout supplement "C4" (contains beta-alanine, caffeine, arginine).    She remains in bed this morning, feeling somewhat better although she did have a wave of nausea and dizziness. Telemetry has revealed only sinus rhythm at approximately 90 beats per minute. She reports no chest pain or significant dyspnea although she does feel intermittent regular palpitations       PAST MEDICAL & SURGICAL HISTORY:  No pertinent past medical history  No significant past surgical history      MEDICATIONS  (STANDING):  ALPRAZolam 0.5 milliGRAM(s) Oral at bedtime  enoxaparin Injectable 40 milliGRAM(s) SubCutaneous every 24 hours  metoclopramide 10 milliGRAM(s) Oral once  sodium chloride 0.9%. 1000 milliLiter(s) (100 mL/Hr) IV Continuous <Continuous>    REVIEW OF SYSTEMS:    CONSTITUTIONAL: pos weakness, no fevers or chills  EYES: No visual changes, No diplopia  ENMT: No throat pain , No exudate  NECK: No pain or stiffness  RESPIRATORY: No cough, wheezing, hemoptysis; No shortness of breath  CARDIOVASCULAR: No chest pain; palps as described  GASTROINTESTINAL: No abdominal pain. pos nausea but no vomiting, or hematemesis; No diarrhea or constipation. No melena or hematochezia.  GENITOURINARY: No dysuria, frequency or hematuria  NEUROLOGICAL: No numbness or weakness  SKIN: No itching or rash  All other review of systems is negative unless indicated above    Vital Signs Last 24 Hrs  T(C): 37.3 (31 Oct 2017 07:28), Max: 37.6 (30 Oct 2017 20:05)  T(F): 99.1 (31 Oct 2017 07:28), Max: 99.6 (30 Oct 2017 20:05)  HR: 78 (31 Oct 2017 07:28) (69 - 126)  BP: 107/68 (31 Oct 2017 07:28) (102/69 - 163/94)  BP(mean): --  RR: 18 (31 Oct 2017 07:28) (16 - 22)  SpO2: 99% (31 Oct 2017 07:28) (98% - 99%)    PHYSICAL EXAM:    Constitutional: NAD, awake and alert, well-developed  Eyes:  EOMI,  Pupils round, no lesions  ENMT: no exudate or erythema  Pulmonary: Non-labored, breath sounds are clear bilaterally, No wheezing, rales or rhonchi  Cardiovascular: PMI not palpable RRR normal S1 and S2, no murmurs, rubs, gallops or clicks  Gastrointestinal: Bowel Sounds present, soft, nontender.   Lymph: No cervical lymphadenopathy.  Neurological: Alert, no focal deficits  Skin: No rashes.  No cyanosis.  Psych:  Mood & affect appropriate   Ext: No lower ext edema      CARDIAC MARKERS ( 30 Oct 2017 15:24 )  <.015 ng/mL / x     / 67 U/L / x     / <0.5 ng/mL                            12.3   7.0   )-----------( 275      ( 31 Oct 2017 06:34 )             38.5     10-31    142  |  109<H>  |  9   ----------------------------<  88  3.9   |  24  |  0.77    Ca    8.2<L>      31 Oct 2017 06:34  Phos  3.3     10-31  Mg     1.9     10-31    TPro  6.4  /  Alb  3.2<L>  /  TBili  0.4  /  DBili  x   /  AST  18  /  ALT  22  /  AlkPhos  51  10-31    < from: 12 Lead ECG (10.30.17 @ 15:00) >    Ventricular Rate 116 BPM    Atrial Rate 117 BPM    P-R Interval 136 ms    QRS Duration 86 ms    Q-T Interval 316 ms    QTC Calculation(Bezet) 439 ms    P Axis 60 degrees    R Axis 75 degrees    T Axis 13 degrees    Diagnosis Line Sinus tachycardia  Otherwise normal ECG  Confirmed by Orville Love ([id number]) on 10/31/2017 9:02:35 AM    < end of copied text >  < from: Xray Chest 1 View AP/PA (10.30.17 @ 16:36) >    EXAM:  CHEST 1 VIEW                            PROCEDURE DATE:  10/30/2017          INTERPRETATION:  History: Weakness.    AP chest.    Heart and mediastinum normal.  Lungs clear.  Costophrenic angles sharp   bilaterally.    IMPRESSION: Normal chest                MISTI HARVEY M.D., ATTENDING RADIOLOGIST  This document has been electronically signed. Oct 30 2017  4:41PM                < end of copied text >    Preliminary echocardiography report revealed a structurally normal heart with no abnormalities

## 2017-10-31 NOTE — PROGRESS NOTE ADULT - SUBJECTIVE AND OBJECTIVE BOX
31 yo old female PMHx eating disorder, gastroparesis, anxiety who is on the floor for tachycardia, fatigue and palpitations. Tele report shows that heart rate went up to 140s and 150s throughout the day, with the highest HR being 160 at 1:32 pm. Patient feels well, however is nervous about going home because her heart continues to randomly race. Parents very supportive, helping her ambulate without difficulty.  Patient reporting 'discomfort' with urination, unlike prior UTIs, no pain, frequency or urgency.  ROS  CONSTITUTIONAL: pos weakness, no fevers or chills  EYES: No visual changes, No diplopia  ENMT: No throat pain , No exudate  NECK: No pain or stiffness  RESPIRATORY: No cough, wheezing, hemoptysis; No shortness of breath  CARDIOVASCULAR: +palpitations, No chest pain  GASTROINTESTINAL: No abdominal pain. pos nausea but no vomiting, or hematemesis; No diarrhea or constipation. No melena or hematochezia.  GENITOURINARY: No dysuria, frequency or hematuria  NEUROLOGICAL: No numbness or weakness  SKIN: No itching or rash  All other review of systems is negative unless indicated above  MEDICATIONS  (STANDING):  ALPRAZolam 0.5 milliGRAM(s) Oral at bedtime  enoxaparin Injectable 40 milliGRAM(s) SubCutaneous every 24 hours  metoclopramide 10 milliGRAM(s) Oral once  sodium chloride 0.9%. 1000 milliLiter(s) (100 mL/Hr) IV Continuous   10-31-17   Vital Signs Last 24 Hrs  T(C): 36.9 (31 Oct 2017 12:07), Max: 37.6 (30 Oct 2017 20:05)  T(F): 98.5 (31 Oct 2017 12:07), Max: 99.6 (30 Oct 2017 20:05)  HR: 95 (31 Oct 2017 12:07) (69 - 126)  BP: 112/70 (31 Oct 2017 12:07) (102/69 - 163/94)  BP(mean): --  RR: 19 (31 Oct 2017 12:07) (16 - 22)  SpO2: 100% (31 Oct 2017 12:07) (98% - 100%)    Physical Exam:  General: Well developed, well nourished, NAD  HEENT: NCAT, PERRLA, EOMI bl, moist mucous membranes   Neck: Supple, nontender, no mass  Neurology: A&Ox3, nonfocal, CN II-XII grossly intact, sensation intact, no gait abnormalities   Respiratory: CTA B/L, No W/R/R  CV: RRR, +S1/S2, no murmurs,   Abdominal: Soft, NT, ND +BSx4  Extremities: No C/C/E, + peripheral pulses  MSK: Normal ROM, no joint erythema or warmth, no joint swelling   Skin: warm, dry, normal color, no rash or abnormal lesions               12.3   7.0   )-----------( 275                   38.5       142  |  109<H>  |  9   ----------------------------<  88  3.9   |  24  |  0.77    Ca    8.2  Phos  3.3       Mg     1.9         TPro  6.4  /  Alb  3.2<L>  /  TBili  0.4  /  DBili  x   /  AST  18  /  ALT  22  /  AlkPhos  51 31 yo old female   PMHx eating disorder, gastroparesis, anxiety who is on the floor for tachycardia, fatigue and palpitations. Tele report shows that heart rate went up to 140s and 150s throughout the day, with the highest HR being 160 at 1:32 pm. Patient feels well, however is nervous about going home because her heart continues to randomly race. Parents very supportive, helping her ambulate without difficulty.  Patient reporting 'discomfort' with urination, unlike prior UTIs, no pain, frequency or urgency. hosp course pt have still on /off transient palpitation while ambulation but no other c/o , no fever , no distress , pt does have anxiety disorder.   ROS  CONSTITUTIONAL: , no fevers or chills  EYES: No visual changes, No diplopia  ENMT: No throat pain , No exudate  NECK: No pain or stiffness  RESPIRATORY: No cough, wheezing, hemoptysis; No shortness of breath  CARDIOVASCULAR: +palpitations, No chest pain  GASTROINTESTINAL: No abdominal pain. pos nausea but no vomiting, or hematemesis; No diarrhea or constipation. No melena or hematochezia.  GENITOURINARY: No dysuria, frequency or hematuria  NEUROLOGICAL: No numbness or weakness  SKIN: No itching or rash  All other review of systems is negative unless indicated above  MEDICATIONS  (STANDING):  ALPRAZolam 0.5 milliGRAM(s) Oral at bedtime  enoxaparin Injectable 40 milliGRAM(s) SubCutaneous every 24 hours  metoclopramide 10 milliGRAM(s) Oral once  sodium chloride 0.9%. 1000 milliLiter(s) (100 mL/Hr) IV Continuous   10-31-17   Vital Signs Last 24 Hrs  T(C): 36.9 (31 Oct 2017 12:07), Max: 37.6 (30 Oct 2017 20:05)  T(F): 98.5 (31 Oct 2017 12:07), Max: 99.6 (30 Oct 2017 20:05)  HR: 95 (31 Oct 2017 12:07) (69 - 126)  BP: 112/70 (31 Oct 2017 12:07) (102/69 - 163/94)  BP(mean): --  RR: 19 (31 Oct 2017 12:07) (16 - 22)  SpO2: 100% (31 Oct 2017 12:07) (98% - 100%)    Physical Exam:  General: Well developed, well nourished, NAD  HEENT: NCAT, PERRLA, EOMI bl, moist mucous membranes   Neck: Supple, nontender, no mass  Neurology: A&Ox3, nonfocal, CN II-XII grossly intact, sensation intact, no gait abnormalities   Respiratory: CTA B/L, No W/R/R  CV: RRR, +S1/S2, no murmurs, no tachy   Abdominal: Soft, NT, ND +BSx4  Extremities: No C/C/E, + peripheral pulses  MSK: Normal ROM, no joint erythema or warmth, no joint swelling   Skin: warm, dry, normal color, no rash or abnormal lesions               12.3   7.0   )-----------( 275                   38.5       142  |  109<H>  |  9   ----------------------------<  88  3.9   |  24  |  0.77    Ca    8.2  Phos  3.3       Mg     1.9         TPro  6.4  /  Alb  3.2<L>  /  TBili  0.4  /  DBili  x   /  AST  18  /  ALT  22  /  AlkPhos  51

## 2017-10-31 NOTE — DISCHARGE NOTE ADULT - MEDICATION SUMMARY - MEDICATIONS TO TAKE
I will START or STAY ON the medications listed below when I get home from the hospital:    metoclopramide 10 mg oral tablet  -- 1 tab(s) by mouth every 8 hours, As Needed   -- Indication: For Need for prophylactic measure    Xanax 0.5 mg oral tablet  -- 1 tab(s) by mouth once a day (at bedtime)  -- Indication: For Need for prophylactic measure I will START or STAY ON the medications listed below when I get home from the hospital:    metoclopramide 10 mg oral tablet  -- 1 tab(s) by mouth every 8 hours, As Needed   -- Indication: For Need for prophylactic measure    Xanax 0.5 mg oral tablet  -- 1 tab(s) by mouth once a day (at bedtime)  -- Indication: For Need for prophylactic measure    metoprolol succinate 25 mg oral tablet, extended release  -- 1 tab(s) by mouth once a day  -- Indication: For Tachycardia I will START or STAY ON the medications listed below when I get home from the hospital:    metoclopramide 10 mg oral tablet  -- 1 tab(s) by mouth every 8 hours, As Needed   -- Indication: For Need for prophylactic measure    Xanax 0.5 mg oral tablet  -- 1 tab(s) by mouth once a day (at bedtime) MDD:0.5mg  -- Indication: For Anxiety    metoprolol succinate 25 mg oral tablet, extended release  -- 1 tab(s) by mouth once a day  -- Indication: For Tachycardia

## 2017-10-31 NOTE — DISCHARGE NOTE ADULT - PLAN OF CARE
Please continue to drink plenty of fluids and increase oral intake as tolerated.  May take Reglan as needed to help decrease nausea and increase to oral intake.  Please follow up with cardiology (Dr. Dill) on Friday or Monday after discharge. Please follow up with cardiology as instructed above. heart rate control Please follow up with cardiology as instructed above.  Please also follow up with your primary care physician within 1 week of discharge. heart rate controlled Please continue to drink plenty of fluids and increase oral intake as tolerated.  May take Reglan as needed to help decrease nausea  sec to hx gastroparesis repeat study out pt  and increase to oral intake.  Please follow up with cardiology (Dr. Dill) on Friday after dc. resolved/ avoid coffee resolved / possible viral syndrome

## 2017-10-31 NOTE — PROGRESS NOTE ADULT - ASSESSMENT
33 yo female with no significant pmhx or pshx who is on the floor for fatigue, tachycadia and palpitations. Possible viral syndrome and structurally normal heart by echo.

## 2017-11-01 VITALS — OXYGEN SATURATION: 99 % | HEART RATE: 81 BPM | RESPIRATION RATE: 18 BRPM | TEMPERATURE: 99 F

## 2017-11-01 DIAGNOSIS — K31.84 GASTROPARESIS: ICD-10-CM

## 2017-11-01 DIAGNOSIS — R39.9 UNSPECIFIED SYMPTOMS AND SIGNS INVOLVING THE GENITOURINARY SYSTEM: ICD-10-CM

## 2017-11-01 DIAGNOSIS — F41.9 ANXIETY DISORDER, UNSPECIFIED: ICD-10-CM

## 2017-11-01 LAB
ANION GAP SERPL CALC-SCNC: 9 MMOL/L — SIGNIFICANT CHANGE UP (ref 5–17)
APPEARANCE UR: CLEAR — SIGNIFICANT CHANGE UP
BILIRUB UR-MCNC: NEGATIVE — SIGNIFICANT CHANGE UP
BUN SERPL-MCNC: 9 MG/DL — SIGNIFICANT CHANGE UP (ref 7–23)
CALCIUM SERPL-MCNC: 8.8 MG/DL — SIGNIFICANT CHANGE UP (ref 8.5–10.1)
CHLORIDE SERPL-SCNC: 109 MMOL/L — HIGH (ref 96–108)
CO2 SERPL-SCNC: 25 MMOL/L — SIGNIFICANT CHANGE UP (ref 22–31)
COLOR SPEC: SIGNIFICANT CHANGE UP
CREAT SERPL-MCNC: 0.73 MG/DL — SIGNIFICANT CHANGE UP (ref 0.5–1.3)
DIFF PNL FLD: NEGATIVE — SIGNIFICANT CHANGE UP
GLUCOSE SERPL-MCNC: 95 MG/DL — SIGNIFICANT CHANGE UP (ref 70–99)
GLUCOSE UR QL: NEGATIVE — SIGNIFICANT CHANGE UP
HCT VFR BLD CALC: 41.1 % — SIGNIFICANT CHANGE UP (ref 34.5–45)
HGB BLD-MCNC: 13.2 G/DL — SIGNIFICANT CHANGE UP (ref 11.5–15.5)
KETONES UR-MCNC: NEGATIVE — SIGNIFICANT CHANGE UP
LEUKOCYTE ESTERASE UR-ACNC: NEGATIVE — SIGNIFICANT CHANGE UP
MCHC RBC-ENTMCNC: 30.2 PG — SIGNIFICANT CHANGE UP (ref 27–34)
MCHC RBC-ENTMCNC: 32.1 GM/DL — SIGNIFICANT CHANGE UP (ref 32–36)
MCV RBC AUTO: 93.8 FL — SIGNIFICANT CHANGE UP (ref 80–100)
NITRITE UR-MCNC: NEGATIVE — SIGNIFICANT CHANGE UP
PH UR: 7 — SIGNIFICANT CHANGE UP (ref 5–8)
PLATELET # BLD AUTO: 277 K/UL — SIGNIFICANT CHANGE UP (ref 150–400)
POTASSIUM SERPL-MCNC: 3.8 MMOL/L — SIGNIFICANT CHANGE UP (ref 3.5–5.3)
POTASSIUM SERPL-SCNC: 3.8 MMOL/L — SIGNIFICANT CHANGE UP (ref 3.5–5.3)
PROT UR-MCNC: NEGATIVE — SIGNIFICANT CHANGE UP
RBC # BLD: 4.38 M/UL — SIGNIFICANT CHANGE UP (ref 3.8–5.2)
RBC # FLD: 11.7 % — SIGNIFICANT CHANGE UP (ref 10.3–14.5)
SODIUM SERPL-SCNC: 143 MMOL/L — SIGNIFICANT CHANGE UP (ref 135–145)
SP GR SPEC: 1 — LOW (ref 1.01–1.02)
UROBILINOGEN FLD QL: NEGATIVE — SIGNIFICANT CHANGE UP
WBC # BLD: 6.3 K/UL — SIGNIFICANT CHANGE UP (ref 3.8–10.5)
WBC # FLD AUTO: 6.3 K/UL — SIGNIFICANT CHANGE UP (ref 3.8–10.5)

## 2017-11-01 PROCEDURE — 99239 HOSP IP/OBS DSCHRG MGMT >30: CPT

## 2017-11-01 PROCEDURE — 93005 ELECTROCARDIOGRAM TRACING: CPT

## 2017-11-01 PROCEDURE — 85730 THROMBOPLASTIN TIME PARTIAL: CPT

## 2017-11-01 PROCEDURE — G0378: CPT

## 2017-11-01 PROCEDURE — 81001 URINALYSIS AUTO W/SCOPE: CPT

## 2017-11-01 PROCEDURE — 36415 COLL VENOUS BLD VENIPUNCTURE: CPT

## 2017-11-01 PROCEDURE — 87086 URINE CULTURE/COLONY COUNT: CPT

## 2017-11-01 PROCEDURE — 83735 ASSAY OF MAGNESIUM: CPT

## 2017-11-01 PROCEDURE — 85379 FIBRIN DEGRADATION QUANT: CPT

## 2017-11-01 PROCEDURE — 84702 CHORIONIC GONADOTROPIN TEST: CPT

## 2017-11-01 PROCEDURE — 82553 CREATINE MB FRACTION: CPT

## 2017-11-01 PROCEDURE — 85610 PROTHROMBIN TIME: CPT

## 2017-11-01 PROCEDURE — 93306 TTE W/DOPPLER COMPLETE: CPT

## 2017-11-01 PROCEDURE — 84436 ASSAY OF TOTAL THYROXINE: CPT

## 2017-11-01 PROCEDURE — 83036 HEMOGLOBIN GLYCOSYLATED A1C: CPT

## 2017-11-01 PROCEDURE — 83690 ASSAY OF LIPASE: CPT

## 2017-11-01 PROCEDURE — 80307 DRUG TEST PRSMV CHEM ANLYZR: CPT

## 2017-11-01 PROCEDURE — 84443 ASSAY THYROID STIM HORMONE: CPT

## 2017-11-01 PROCEDURE — 99233 SBSQ HOSP IP/OBS HIGH 50: CPT

## 2017-11-01 PROCEDURE — 85027 COMPLETE CBC AUTOMATED: CPT

## 2017-11-01 PROCEDURE — 81003 URINALYSIS AUTO W/O SCOPE: CPT

## 2017-11-01 PROCEDURE — 71045 X-RAY EXAM CHEST 1 VIEW: CPT

## 2017-11-01 PROCEDURE — 99285 EMERGENCY DEPT VISIT HI MDM: CPT | Mod: 25

## 2017-11-01 PROCEDURE — 84480 ASSAY TRIIODOTHYRONINE (T3): CPT

## 2017-11-01 PROCEDURE — 80053 COMPREHEN METABOLIC PANEL: CPT

## 2017-11-01 PROCEDURE — 84484 ASSAY OF TROPONIN QUANT: CPT

## 2017-11-01 PROCEDURE — 84100 ASSAY OF PHOSPHORUS: CPT

## 2017-11-01 PROCEDURE — 82550 ASSAY OF CK (CPK): CPT

## 2017-11-01 PROCEDURE — 96374 THER/PROPH/DIAG INJ IV PUSH: CPT

## 2017-11-01 PROCEDURE — 80048 BASIC METABOLIC PNL TOTAL CA: CPT

## 2017-11-01 RX ORDER — ALPRAZOLAM 0.25 MG
1 TABLET ORAL
Qty: 7 | Refills: 0 | OUTPATIENT
Start: 2017-11-01 | End: 2017-11-08

## 2017-11-01 RX ORDER — ALPRAZOLAM 0.25 MG
1 TABLET ORAL
Qty: 0 | Refills: 0 | COMMUNITY

## 2017-11-01 RX ADMIN — Medication 25 MILLIGRAM(S): at 08:10

## 2017-11-01 NOTE — PROGRESS NOTE ADULT - ASSESSMENT
32 year old woman with anxiety with an unexplained sinus tachycardia. Possible viral syndrome. Structurally normal heart by echocardiogram    - ambulate again this morning  - Check orthostatics. There is the possibility of autonomic instability in the setting of decreased po and fluid intake  - Continue with Toprol XL at current dose.  - Her tachycardia is unlikely the result of structural heart disease.  Rather, it is likely reactive to some systemic process  - Her TSH was normal  - I would encourage po intake and give more IVF  - To follow with you.  Further work up to depend on clinical course.

## 2017-11-01 NOTE — PROGRESS NOTE ADULT - SUBJECTIVE AND OBJECTIVE BOX
[facility] - Deneen Whitehead, Fuentes, Mary Jo, Bess, Christianne Bustillo  Office Number:  [phone]    Patient resting comfortably in bed in NAD.  Reports dizziness  also with dizziness with walking    ROS: negative unless otherwise mentioned.    Telemetry:  SR     MEDICATIONS  (STANDING):  ALPRAZolam 0.5 milliGRAM(s) Oral at bedtime  enoxaparin Injectable 40 milliGRAM(s) SubCutaneous every 24 hours  metoprolol succinate ER 25 milliGRAM(s) Oral daily  sodium chloride 0.9%. 1000 milliLiter(s) (100 mL/Hr) IV Continuous <Continuous>    MEDICATIONS  (PRN):      Allergies    No Known Allergies    Intolerances      Vital Signs Last 24 Hrs  T(C): 37.1 (01 Nov 2017 07:25), Max: 37.1 (31 Oct 2017 16:06)  T(F): 98.7 (01 Nov 2017 07:25), Max: 98.7 (31 Oct 2017 16:06)  HR: 85 (01 Nov 2017 07:25) (84 - 95)  BP: 121/81 (01 Nov 2017 07:25) (98/64 - 121/81)  BP(mean): --  RR: 16 (01 Nov 2017 07:25) (16 - 19)  SpO2: 99% (01 Nov 2017 07:25) (98% - 100%)    I&O's Summary    31 Oct 2017 07:01  -  01 Nov 2017 07:00  --------------------------------------------------------  IN: 800 mL / OUT: 0 mL / NET: 800 mL        ON EXAM:    General: NAD, awake and alert, oriented x 3  HEENT: Mucous membranes are moist, anicteric  Lungs: Non-labored, breath sounds are clear bilaterally, No wheezing, rales or rhonchi  Cardiovascular: Regular, S1 and S2, no murmurs, rubs, or gallops  Gastrointestinal: Bowel Sounds present, soft, nontender.   Lymph: No peripheral edema. No lymphadenopathy.  Skin: No rashes or ulcers  Psych:  Mood & affect appropriate    LABS: All Labs Reviewed:                        13.2   6.3   )-----------( 277      ( 01 Nov 2017 07:53 )             41.1                         12.3   7.0   )-----------( 275      ( 31 Oct 2017 06:34 )             38.5                         14.6   8.2   )-----------( 344      ( 30 Oct 2017 15:24 )             45.9     01 Nov 2017 07:53    143    |  109    |  9      ----------------------------<  95     3.8     |  25     |  0.73   31 Oct 2017 06:34    142    |  109    |  9      ----------------------------<  88     3.9     |  24     |  0.77   30 Oct 2017 15:24    141    |  105    |  11     ----------------------------<  112    3.8     |  25     |  0.88     Ca    8.8        01 Nov 2017 07:53  Ca    8.2        31 Oct 2017 06:34  Ca    9.4        30 Oct 2017 15:24  Phos  3.3       31 Oct 2017 06:34  Mg     1.9       31 Oct 2017 06:34    TPro  6.4    /  Alb  3.2    /  TBili  0.4    /  DBili  x      /  AST  18     /  ALT  22     /  AlkPhos  51     31 Oct 2017 06:34  TPro  8.4    /  Alb  4.2    /  TBili  0.2    /  DBili  x      /  AST  16     /  ALT  30     /  AlkPhos  68     30 Oct 2017 15:24    PT/INR - ( 30 Oct 2017 16:42 )   PT: 11.2 sec;   INR: 1.03 ratio         PTT - ( 30 Oct 2017 16:42 )  PTT:26.2 sec  CARDIAC MARKERS ( 30 Oct 2017 15:24 )  <.015 ng/mL / x     / 67 U/L / x     / <0.5 ng/mL      Blood Culture:     10-31 @ 06:34  TSH: 3.80

## 2017-11-02 LAB
CULTURE RESULTS: NO GROWTH — SIGNIFICANT CHANGE UP
SPECIMEN SOURCE: SIGNIFICANT CHANGE UP

## 2017-11-03 ENCOUNTER — APPOINTMENT (OUTPATIENT)
Dept: CARDIOLOGY | Facility: CLINIC | Age: 32
End: 2017-11-03
Payer: COMMERCIAL

## 2017-11-03 ENCOUNTER — NON-APPOINTMENT (OUTPATIENT)
Age: 32
End: 2017-11-03

## 2017-11-03 VITALS
HEIGHT: 67 IN | DIASTOLIC BLOOD PRESSURE: 80 MMHG | HEART RATE: 79 BPM | BODY MASS INDEX: 21.19 KG/M2 | SYSTOLIC BLOOD PRESSURE: 127 MMHG | WEIGHT: 135 LBS | OXYGEN SATURATION: 99 %

## 2017-11-03 PROCEDURE — 99215 OFFICE O/P EST HI 40 MIN: CPT

## 2017-11-03 PROCEDURE — 93000 ELECTROCARDIOGRAM COMPLETE: CPT

## 2017-11-03 RX ORDER — HYDROCORTISONE 25 MG/G
2.5 CREAM TOPICAL
Qty: 30 | Refills: 0 | Status: DISCONTINUED | COMMUNITY
Start: 2017-10-06

## 2017-11-03 RX ORDER — ALPRAZOLAM 0.5 MG/1
0.5 TABLET ORAL
Qty: 30 | Refills: 0 | Status: ACTIVE | COMMUNITY
Start: 2017-09-27

## 2017-11-03 RX ORDER — NITROFURANTOIN (MONOHYDRATE/MACROCRYSTALS) 25; 75 MG/1; MG/1
100 CAPSULE ORAL
Qty: 10 | Refills: 0 | Status: DISCONTINUED | COMMUNITY
Start: 2017-09-10

## 2017-11-03 RX ORDER — METOPROLOL SUCCINATE 25 MG/1
25 TABLET, EXTENDED RELEASE ORAL
Refills: 0 | Status: DISCONTINUED | COMMUNITY

## 2017-11-03 RX ORDER — DOXYCYCLINE 75 MG/1
75 TABLET, FILM COATED ORAL
Qty: 14 | Refills: 0 | Status: DISCONTINUED | COMMUNITY
Start: 2017-10-06

## 2017-11-03 RX ORDER — ALPRAZOLAM 0.25 MG/1
0.25 TABLET ORAL
Qty: 30 | Refills: 0 | Status: DISCONTINUED | COMMUNITY
Start: 2017-06-10

## 2017-11-03 RX ORDER — ONDANSETRON 8 MG/1
8 TABLET, ORALLY DISINTEGRATING ORAL
Qty: 15 | Refills: 0 | Status: DISCONTINUED | COMMUNITY
Start: 2017-10-23

## 2017-11-03 RX ORDER — CIPROFLOXACIN HYDROCHLORIDE 500 MG/1
500 TABLET, FILM COATED ORAL
Qty: 14 | Refills: 0 | Status: DISCONTINUED | COMMUNITY
Start: 2017-08-21

## 2017-11-06 ENCOUNTER — CHART COPY (OUTPATIENT)
Age: 32
End: 2017-11-06

## 2017-11-08 ENCOUNTER — TRANSCRIPTION ENCOUNTER (OUTPATIENT)
Age: 32
End: 2017-11-08

## 2017-11-09 ENCOUNTER — OUTPATIENT (OUTPATIENT)
Dept: OUTPATIENT SERVICES | Facility: HOSPITAL | Age: 32
LOS: 1 days | End: 2017-11-09
Payer: COMMERCIAL

## 2017-11-09 DIAGNOSIS — R00.0 TACHYCARDIA, UNSPECIFIED: ICD-10-CM

## 2017-11-09 PROCEDURE — 93660 TILT TABLE EVALUATION: CPT | Mod: 26

## 2017-11-09 PROCEDURE — 93660 TILT TABLE EVALUATION: CPT

## 2017-11-16 ENCOUNTER — APPOINTMENT (OUTPATIENT)
Dept: CARDIOLOGY | Facility: CLINIC | Age: 32
End: 2017-11-16
Payer: COMMERCIAL

## 2017-11-16 VITALS
BODY MASS INDEX: 21.03 KG/M2 | WEIGHT: 134 LBS | HEIGHT: 67 IN | DIASTOLIC BLOOD PRESSURE: 80 MMHG | HEART RATE: 80 BPM | SYSTOLIC BLOOD PRESSURE: 154 MMHG | OXYGEN SATURATION: 100 %

## 2017-11-16 PROCEDURE — 99401 PREV MED CNSL INDIV APPRX 15: CPT

## 2017-11-16 PROCEDURE — 99215 OFFICE O/P EST HI 40 MIN: CPT

## 2017-11-16 PROCEDURE — 93000 ELECTROCARDIOGRAM COMPLETE: CPT

## 2017-11-16 RX ORDER — MECLIZINE HYDROCHLORIDE 12.5 MG/1
12.5 TABLET ORAL
Qty: 90 | Refills: 0 | Status: DISCONTINUED | COMMUNITY
Start: 2017-11-03 | End: 2017-11-16

## 2017-11-16 RX ORDER — METOCLOPRAMIDE 10 MG/1
10 TABLET ORAL
Qty: 21 | Refills: 0 | Status: DISCONTINUED | COMMUNITY
Start: 2017-10-31 | End: 2017-11-16

## 2017-12-27 ENCOUNTER — RX RENEWAL (OUTPATIENT)
Age: 32
End: 2017-12-27

## 2017-12-27 ENCOUNTER — TRANSCRIPTION ENCOUNTER (OUTPATIENT)
Age: 32
End: 2017-12-27

## 2018-04-18 NOTE — PATIENT PROFILE ADULT. - SPIRITUAL CULTURAL, RELIGIOUS PRACTICES/VALUES, PROFILE
Z-Juan Carlos is better for respiratory issues/bronchitis - Augmentin is probably better for sinus issues  If worsening respiratory issues okay Z-Juan Carlos     none

## 2018-05-09 ENCOUNTER — APPOINTMENT (OUTPATIENT)
Dept: CARDIOLOGY | Facility: CLINIC | Age: 33
End: 2018-05-09
Payer: COMMERCIAL

## 2018-05-09 ENCOUNTER — NON-APPOINTMENT (OUTPATIENT)
Age: 33
End: 2018-05-09

## 2018-05-09 VITALS
HEART RATE: 90 BPM | OXYGEN SATURATION: 99 % | SYSTOLIC BLOOD PRESSURE: 145 MMHG | BODY MASS INDEX: 24.33 KG/M2 | HEIGHT: 67 IN | DIASTOLIC BLOOD PRESSURE: 83 MMHG | WEIGHT: 155 LBS

## 2018-05-09 VITALS — SYSTOLIC BLOOD PRESSURE: 110 MMHG | DIASTOLIC BLOOD PRESSURE: 60 MMHG

## 2018-05-09 PROCEDURE — 93000 ELECTROCARDIOGRAM COMPLETE: CPT

## 2018-05-09 PROCEDURE — 99214 OFFICE O/P EST MOD 30 MIN: CPT

## 2018-06-16 ENCOUNTER — APPOINTMENT (OUTPATIENT)
Dept: INTERNAL MEDICINE | Facility: CLINIC | Age: 33
End: 2018-06-16

## 2018-07-30 ENCOUNTER — RX RENEWAL (OUTPATIENT)
Age: 33
End: 2018-07-30

## 2018-12-17 NOTE — ED PROVIDER NOTE - NS ED MD EM SELECTION
POST OPERATIVE NOTE    Olegario Jade      PROCEDURE-this patient is seen post excision of soft tissue mass in the bottom of his right foot. He is doing well offering no complaints of pain and he has maintained a full nonweightbearing status.      Vitals:    12/17/18 1356   BP: 132/82   Pulse: 60   Resp: 20   Temp: 97.8 °F (36.6 °C)         Current Outpatient Medications   Medication Sig   • HYDROcodone-acetaminophen (NORCO) 5-325 MG per tablet Take 1 tablet by mouth every 4 hours as needed for Pain (Every 4 hours as needed for pain).   • amlodipine-benazepril (LOTREL) 5-20 MG per capsule TAKE 2 CAPSULES BY MOUTH DAILY (Patient taking differently: 1 capsule by mouth BID)   • sotalol (BETAPACE) 120 MG tablet TAKE 1 TABLET BY MOUTH TWICE DAILY   • flecainide (TAMBOCOR) 100 MG tablet TAKE 1 TABLET BY MOUTH TWICE DAILY   • hydrochlorothiazide (HYDRODIURIL) 25 MG tablet TAKE 1 TABLET BY MOUTH EVERY MORNING   • flecainide (TAMBOCOR) 100 MG tablet Take 1 tablet by mouth 2 times daily.   • apixaban (ELIQUIS) 5 MG Tab Take 1 tablet by mouth every 12 hours.   • omeprazole (PRILOSEC) 20 MG capsule TAKE ONE CAPSULE BY MOUTH TWICE DAILY   • allopurinol (ZYLOPRIM) 300 MG tablet TAKE 1 TABLET BY MOUTH DAILY.   • ibuprofen (MOTRIN) 200 MG tablet Take 200 mg by mouth every 6 hours as needed for Pain.   • fluticasone (FLONASE) 50 MCG/ACT nasal spray Spray 2 sprays in each nostril daily. (Patient taking differently: Spray 2 sprays in each nostril as needed. )   • aspirin (ECOTRIN) 81 MG EC tablet Take 81 mg by mouth daily.     No current facility-administered medications for this visit.                PHYSICAL EXAM-his dressing was dry and intact. His incision is clean and free of any sign of infection. It's obvious he had a substantial mass has the prominence is not there anymore post excision. Swelling is minimal and he has no paresthesias          ASSESSMENT_doing well post excision of soft tissue mass.        PLAN-we change the  bandage redressed the foot and we'll maintain a dry foot and he will maintain a full nonweightbearing status.     [id number] Comprehensive

## 2019-03-21 ENCOUNTER — TRANSCRIPTION ENCOUNTER (OUTPATIENT)
Age: 34
End: 2019-03-21

## 2019-05-22 ENCOUNTER — RX RENEWAL (OUTPATIENT)
Age: 34
End: 2019-05-22

## 2020-06-03 ENCOUNTER — APPOINTMENT (OUTPATIENT)
Dept: CARDIOLOGY | Facility: CLINIC | Age: 35
End: 2020-06-03
Payer: COMMERCIAL

## 2020-06-03 DIAGNOSIS — Z00.00 ENCOUNTER FOR GENERAL ADULT MEDICAL EXAMINATION W/OUT ABNORMAL FINDINGS: ICD-10-CM

## 2020-06-03 PROCEDURE — 99214 OFFICE O/P EST MOD 30 MIN: CPT | Mod: 95

## 2020-07-07 NOTE — PATIENT PROFILE ADULT. - VISION (WITH CORRECTIVE LENSES IF THE PATIENT USUALLY WEARS THEM):
Normal vision: sees adequately in most situations; can see medication labels, newsprint Repair Type: Graft

## 2020-10-01 ENCOUNTER — RX RENEWAL (OUTPATIENT)
Age: 35
End: 2020-10-01

## 2021-03-05 NOTE — PATIENT PROFILE ADULT. - PRESSURE ULCER(S)
Patient Education     Patient Education     Exercise: Making the Most of Your Time  Your exercise goal is 30 minutes on most days. Aim for a total of 150 or more minutes a week. Not sure you can fit one 30-minute block of exercise time into your day? Split it up into shorter 10- and 15-minute blocks. Do this 2 to 3 times a day. You'll still get all of the benefits of exercise.    Use your whole body  Aerobic exercise works your heart and lungs. Some examples are walking, running, and cycling. Try adding a few other activities, too. This can help you strengthen and stretch many different muscles in your body.  Strengthen your:  · Lower legs. Go up and down slowly on your toes, while you’re filing papers or washing dishes.  · Upper legs. Lower yourself slowly into a chair without using your arms.  Stretch your:  · Back. After you get up from a chair, place your palms on your low back and lean your upper body back.  · Shoulders and chest. Lift your arms overhead and reach tall while waiting for your computer to warm up.  · Lower legs. Raise your toes and press them against a wall (with your heel on the ground).  Find a few extra minutes  Try these tips to add some extra exercise and activity to your day:  · At work. Pick a lunch spot a few blocks away and walk there and back. Take a brisk walk on your break.  · At home. Ride bikes with your kids. Use an exercise bike in the living room while you watch TV.  · On errands. Park a few blocks away from where you need to go and walk there. Power-walk in malls by doing a fast lap or two before shopping.  · At play. Go hiking with friends instead of sitting on a bench. Walk through a street fair instead of sitting in a movie.  Tips for sticking with it  · Plan your activity by writing it on a calendar.  · Find a janina at work to walk with during a lunch break.  · Take your kids with you on a short walk after dinner.  · Post a reminder list of the benefits of being active where  you can see it.  · Set an alarm to tell you when it’s time for an activity break.   Date Last Reviewed: 3/1/2018  © 9505-8054 [employer]. [address] [place] [address], [place] [address]. All rights reserved. This information is not intended as a substitute for professional medical care. Always follow your healthcare professional's instructions.           4 Steps for Eating Healthier  Changing the way you eat can improve your health. It can lower your cholesterol and blood pressure, and help you stay at a healthy weight. Your diet doesn’t have to be bland and boring to be healthy. Just watch your calories and follow these steps:    Step 1. Eat fewer unhealthy fats  · Choose more fish and lean meats instead of fatty cuts of meat.  · Skip butter and lard, and use less margarine.  · Pass on foods that have palm, coconut, or hydrogenated oils.  · Eat fewer high-fat dairy foods like cheese, ice cream, and whole milk.  · Get a heart-healthy cookbook and try some low-fat recipes.  Step 2. Go light on salt  · Keep the saltshaker off the table.  · Limit high-salt ingredients, such as soy sauce, bouillon, and garlic salt.  · Instead of adding salt when cooking, season your food with herbs and flavorings. Try lemon, garlic, and onion, or salt-free herb seasonings.  · Limit convenience foods, such as boxed or canned foods and restaurant food.  · Read food labels and choose lower-sodium options.  Step 3. Limit sugar  · Pause before you add sugars to pancakes, cereal, coffee, or tea. This includes white and brown table sugar, syrup, honey, and molasses. Cut your usual amount by half.  · Use non-sugar sweeteners. Stevia, aspartame, and sucralose can satisfy a sweet tooth without adding calories.  · Swap out sugar-filled soda and other drinks. Buy sugar-free or low-calorie beverages. Remember water is always the best choice.  · Read labels and choose foods with less added sugar. Keep in mind that dairy foods and foods with  fruit will have some natural sugar.  · Cut the sugar in recipes by 1/3 to 1/2. Boost the flavor with extracts like almond, vanilla, or orange. Or add spices such as cinnamon or nutmeg.  Step 4. Eat more fiber  · Eat fresh fruits and vegetables every day.  · Boost your diet with whole grains. Go for oats, whole-grain rice, and bran.  · Add beans and lentils to your meals.  · Drink more water to match your fiber increase to help prevent constipation.  Date Last Reviewed: 6/1/2017  © 7619-1584 [employer]. [address] [place] [address], [place] [address]. All rights reserved. This information is not intended as a substitute for professional medical care. Always follow your healthcare professional's instructions.            no

## 2021-04-14 ENCOUNTER — NON-APPOINTMENT (OUTPATIENT)
Age: 36
End: 2021-04-14

## 2021-05-05 ENCOUNTER — NON-APPOINTMENT (OUTPATIENT)
Age: 36
End: 2021-05-05

## 2021-05-17 ENCOUNTER — NON-APPOINTMENT (OUTPATIENT)
Age: 36
End: 2021-05-17

## 2021-05-17 ENCOUNTER — APPOINTMENT (OUTPATIENT)
Dept: ALLERGY | Facility: CLINIC | Age: 36
End: 2021-05-17
Payer: COMMERCIAL

## 2021-05-17 VITALS
TEMPERATURE: 98.2 F | WEIGHT: 135 LBS | OXYGEN SATURATION: 98 % | HEART RATE: 79 BPM | HEIGHT: 67 IN | RESPIRATION RATE: 14 BRPM | BODY MASS INDEX: 21.19 KG/M2 | DIASTOLIC BLOOD PRESSURE: 72 MMHG | SYSTOLIC BLOOD PRESSURE: 100 MMHG

## 2021-05-17 DIAGNOSIS — D72.10 EOSINOPHILIA, UNSPECIFIED: ICD-10-CM

## 2021-05-17 PROCEDURE — 99072 ADDL SUPL MATRL&STAF TM PHE: CPT

## 2021-05-17 PROCEDURE — 99204 OFFICE O/P NEW MOD 45 MIN: CPT | Mod: 25

## 2021-05-17 PROCEDURE — 95018 ALL TSTG PERQ&IQ DRUGS/BIOL: CPT

## 2021-05-17 PROCEDURE — 95004 PERQ TESTS W/ALRGNC XTRCS: CPT

## 2021-05-17 RX ORDER — LEVOFLOXACIN 500 MG/1
500 TABLET, FILM COATED ORAL
Qty: 7 | Refills: 0 | Status: DISCONTINUED | COMMUNITY
Start: 2020-11-23

## 2021-05-17 RX ORDER — PANTOPRAZOLE 40 MG/1
40 TABLET, DELAYED RELEASE ORAL
Qty: 30 | Refills: 0 | Status: ACTIVE | COMMUNITY
Start: 2021-05-02

## 2021-05-17 RX ORDER — ONDANSETRON 4 MG/1
4 TABLET, ORALLY DISINTEGRATING ORAL
Qty: 12 | Refills: 0 | Status: DISCONTINUED | COMMUNITY
Start: 2021-03-31

## 2021-05-17 RX ORDER — NORETHINDRONE ACETATE AND ETHINYL ESTRADIOL, ETHINYL ESTRADIOL AND FERROUS FUMARATE 1MG-10(24)
1 MG-10 MCG / KIT ORAL
Qty: 28 | Refills: 0 | Status: DISCONTINUED | COMMUNITY
Start: 2017-07-06 | End: 2021-05-17

## 2021-05-17 NOTE — SOCIAL HISTORY
[Spouse/Partner] : spouse/partner [Dog] : dog [Single] : single [FreeTextEntry2] :   [Smokers in Household] : there are no smokers in the home

## 2021-05-17 NOTE — ASSESSMENT
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

## 2021-05-17 NOTE — PHYSICAL EXAM
[Alert] : alert [Well Nourished] : well nourished [Healthy Appearance] : healthy appearance [No Acute Distress] : no acute distress [Well Developed] : well developed [Normal Voice/Communication] : normal voice communication [No Neck Mass] : no neck mass was observed [No LAD] : no lymphadenopathy [No Thyroid Mass] : no thyroid mass [Supple] : the neck was supple [Normal Rate and Effort] : normal respiratory rhythm and effort [No Crackles] : no crackles [No Retractions] : no retractions [Wheezing] : no wheezing was heard [Normal Rate] : heart rate was normal  [Normal S1, S2] : normal S1 and S2 [No murmur] : no murmur [Regular Rhythm] : with a regular rhythm [Soft] : abdomen soft [Not Distended] : not distended [Normal Cervical Lymph Nodes] : cervical [Normal Axillary Lumph Nodes] : axillary [Normal Inguinal Lymph Nodes] : inguinal [No Rash] : no rash [No clubbing] : no clubbing [No Cyanosis] : no cyanosis [Normal Mood] : mood was normal [Normal Affect] : affect was normal [Judgment and Insight Age Appropriate] : judgement and insight is age appropriate [Alert, Awake, Oriented as Age-Appropriate] : alert, awake, oriented as age appropriate

## 2021-05-17 NOTE — HISTORY OF PRESENT ILLNESS
[Asthma] : asthma [Allergic Rhinitis] : allergic rhinitis [Venom Reactions] : venom reactions [Food Allergies] : food allergies [de-identified] : 2 months ago she had diarrhea - abdominal cramping - felt like she was going to vomit - 10 days into the symptoms - she was seen at Urgent Care - no improvement - she called PCP - lactose testing - diagnosed lactose intolerance - no improvement in her symptoms - she restarted dairy.   Lab work performed - negative to celiac disease - she was told that she had an elevated eosinophil count.   Patient had an endoscopy - eosinophils found in the esophagus.  She was treated with pantoprazole which has lessened her symptoms.  No restriction on her diet.   She has improved 80% with PPI medical therapy.    No lodging sensation of food in her throat. \par \par Patient feels worsening abdominal pain with coffee - spicy foods - alcohol. \par \par Mild history of eczema.   No foreign travel.   Never enlisted in the .  No remote traveling.  \par \par

## 2021-05-18 PROBLEM — D72.10 EOSINOPHILIA: Status: ACTIVE | Noted: 2021-05-18

## 2021-08-23 ENCOUNTER — NON-APPOINTMENT (OUTPATIENT)
Age: 36
End: 2021-08-23

## 2021-10-18 ENCOUNTER — RX RENEWAL (OUTPATIENT)
Age: 36
End: 2021-10-18

## 2021-11-02 ENCOUNTER — APPOINTMENT (OUTPATIENT)
Dept: CARDIOLOGY | Facility: CLINIC | Age: 36
End: 2021-11-02
Payer: COMMERCIAL

## 2021-11-02 PROCEDURE — 99214 OFFICE O/P EST MOD 30 MIN: CPT | Mod: 95

## 2021-11-04 NOTE — HISTORY OF PRESENT ILLNESS
[Home] : at home, [unfilled] , at the time of the visit. [Medical Office: ([facility])___] : at the medical office located in  [Verbal consent obtained from patient] : the patient, [unfilled] [FreeTextEntry1] : This is a 36  year old woman with a history of anxiety who requests a telehealth visit to review her history of POTS.   She was found to have sinus tachycardia to the 150s of unclear etiology.  She was evaluated as an outpatient and had negative blood work, including a TSH.  She had negative orthostatics, and a TTE that showed a structurally normal heart.  I referred her for a tilt table best, and this confirmed postural tachycardia consistent with POTS.  SHe was started on a beta blocker, and feels better on this drug.  She got a second opinion by Dr. Burns at [place], and nothing further was offered in terms of diagnosis or treatment.  She still feels her heart racing out of her chest at times, and takes extra doses of Toprol when she feels this way.  She is exercising, and feels well.  No new complaints.   [FreeTextEntry4] : Erendira Mohan MA

## 2021-11-04 NOTE — PHYSICAL EXAM
[Normal Appearance] : normal appearance [General Appearance - In No Acute Distress] : no acute distress [Normal Conjunctiva] : the conjunctiva exhibited no abnormalities [Normal Oral Mucosa] : normal oral mucosa [Normal Jugular Venous V Waves Present] : normal jugular venous V waves present [Respiration, Rhythm And Depth] : normal respiratory rhythm and effort [Exaggerated Use Of Accessory Muscles For Inspiration] : no accessory muscle use [Auscultation Breath Sounds / Voice Sounds] : lungs were clear to auscultation bilaterally [Bowel Sounds] : normal bowel sounds [Abdomen Soft] : soft [Abdomen Tenderness] : non-tender [Abnormal Walk] : normal gait [Gait - Sufficient For Exercise Testing] : the gait was sufficient for exercise testing [Nail Clubbing] : no clubbing of the fingernails [Cyanosis, Localized] : no localized cyanosis [Petechial Hemorrhages (___cm)] : no petechial hemorrhages [Skin Color & Pigmentation] : normal skin color and pigmentation [] : no rash [No Venous Stasis] : no venous stasis [Skin Lesions] : no skin lesions [Oriented To Time, Place, And Person] : oriented to person, place, and time [Affect] : the affect was normal [Mood] : the mood was normal [No Anxiety] : not feeling anxious [Not Palpable] : not palpable [Normal Rate] : normal [Rhythm Regular] : regular [Normal S1] : normal S1 [Normal S2] : normal S2 [No Murmur] : no murmurs heard [2+] : left 2+ [No Abnormalities] : the abdominal aorta was not enlarged and no bruit was heard [No Pitting Edema] : no pitting edema present [Right Carotid Bruit] : no bruit heard over the right carotid [Left Carotid Bruit] : no bruit heard over the left carotid [Bruit] : no bruit heard

## 2021-11-04 NOTE — REASON FOR VISIT
[Follow-Up - Clinic] : a clinic follow-up of [Palpitations] : palpitations [FreeTextEntry1] : tachycardia

## 2021-11-04 NOTE — DISCUSSION/SUMMARY
[FreeTextEntry1] : This is a 36 year old woman with a history of anxiety with with postural orthostatic tachycardia.  We discussed the natural history and treatment of this disease.  She is going to stay very well hydrated, liberalize her salt.  She is exercising, and will continue to do so.  She will continue Toprol 25 BID, and she can take an extra dose when needed.     She will follow yearly.  I am sending a script for a full set of labs.

## 2022-10-05 ENCOUNTER — NON-APPOINTMENT (OUTPATIENT)
Age: 37
End: 2022-10-05

## 2022-10-06 ENCOUNTER — APPOINTMENT (OUTPATIENT)
Dept: CARDIOLOGY | Facility: CLINIC | Age: 37
End: 2022-10-06

## 2022-10-06 DIAGNOSIS — R00.0 TACHYCARDIA, UNSPECIFIED: ICD-10-CM

## 2022-10-06 PROCEDURE — 99215 OFFICE O/P EST HI 40 MIN: CPT | Mod: 95

## 2022-10-10 ENCOUNTER — APPOINTMENT (OUTPATIENT)
Dept: CARDIOLOGY | Facility: CLINIC | Age: 37
End: 2022-10-10

## 2022-10-13 ENCOUNTER — NON-APPOINTMENT (OUTPATIENT)
Age: 37
End: 2022-10-13

## 2023-05-17 ENCOUNTER — OFFICE (OUTPATIENT)
Dept: URBAN - METROPOLITAN AREA CLINIC 105 | Facility: CLINIC | Age: 38
Setting detail: OPHTHALMOLOGY
End: 2023-05-17
Payer: COMMERCIAL

## 2023-05-17 DIAGNOSIS — H53.19: ICD-10-CM

## 2023-05-17 PROCEDURE — 92134 CPTRZ OPH DX IMG PST SGM RTA: CPT | Performed by: OPHTHALMOLOGY

## 2023-05-17 PROCEDURE — 92004 COMPRE OPH EXAM NEW PT 1/>: CPT | Performed by: OPHTHALMOLOGY

## 2023-05-17 ASSESSMENT — TONOMETRY
OS_IOP_MMHG: 14
OD_IOP_MMHG: 18

## 2023-05-17 ASSESSMENT — VISUAL ACUITY
OD_BCVA: 20/20
OS_BCVA: 20/20

## 2023-05-17 ASSESSMENT — CONFRONTATIONAL VISUAL FIELD TEST (CVF)
OS_FINDINGS: FULL
OD_FINDINGS: FULL

## 2023-12-21 ENCOUNTER — NON-APPOINTMENT (OUTPATIENT)
Age: 38
End: 2023-12-21

## 2023-12-22 ENCOUNTER — APPOINTMENT (OUTPATIENT)
Dept: CARDIOLOGY | Facility: CLINIC | Age: 38
End: 2023-12-22
Payer: COMMERCIAL

## 2023-12-22 DIAGNOSIS — G90.A POSTURAL ORTHOSTATIC TACHYCARDIA SYNDROME [POTS]: ICD-10-CM

## 2023-12-22 PROCEDURE — 99214 OFFICE O/P EST MOD 30 MIN: CPT | Mod: 95

## 2023-12-22 RX ORDER — METOPROLOL SUCCINATE 25 MG/1
25 TABLET, EXTENDED RELEASE ORAL DAILY
Qty: 90 | Refills: 3 | Status: ACTIVE | COMMUNITY
Start: 2017-10-31 | End: 1900-01-01

## 2023-12-22 NOTE — DISCUSSION/SUMMARY
[FreeTextEntry1] : This is a 38 year old woman with a history of anxiety with with postural orthostatic tachycardia.  She is now 7 months post partum.  She needed to deliver early as she was diagnosed with pre eclampsia as she had protein in her urine.  She reports that she thinks that she had significant tachycardia as well.  Since delivery, she has had multiple episodes of visual changes, and is now having static vision.  She reports this is present all the time, and she has followed with neurology and ophthalmology about this.   She is going to resume Toprol, and can try to take it BID.  I recommended 1 gallon of fluid per day, increasing salt intake, regular low level exercise, anxiety control, and neuro/ophthalmology follow up.   She may need another EP opinion of this does not cause her to feel better.  WE will touch base again in two months to monitor her progress.

## 2023-12-22 NOTE — HISTORY OF PRESENT ILLNESS
[Home] : at home, [unfilled] , at the time of the visit. [Medical Office: ([facility])___] : at the medical office located in  [Verbal consent obtained from patient] : the patient, [unfilled] [FreeTextEntry1] : This is a 38  year old woman with a history of anxiety who requests a telehealth visit to review her history of POTS.   She was found to have sinus tachycardia to the 150s of unclear etiology.  She was evaluated as an outpatient and had negative blood work, including a TSH.  She had negative orthostatics, and a TTE that showed a structurally normal heart.  I referred her for a tilt table best, and this confirmed postural tachycardia consistent with POTS.  She was started on a beta blocker, and has felt better on this drug.  She got a second opinion by Dr. Burns at [facility], and nothing further was offered in terms of diagnosis or treatment.    She is now 7 months post partum.  She needed to deliver early as she was diagnosed with pre eclampsia as she had protein in her urine.  She reports that she thinks that she had significant tachycardia as well.  Since delivery, she has had multiple episodes of visual changes, and is now having static vision.  She reports this is present all the time, and she has followed with neurology and ophthalmology about this.    She denies dizziness or syncope.  No chest pain or increased dyspnea.  She has not been exercising, hydrating, taking the Toprol, or wearing compression stockings.  [FreeTextEntry4] : Erendira Mohan MA

## 2024-01-24 ENCOUNTER — APPOINTMENT (OUTPATIENT)
Dept: ELECTROPHYSIOLOGY | Facility: CLINIC | Age: 39
End: 2024-01-24
Payer: COMMERCIAL

## 2024-01-24 PROCEDURE — 99203 OFFICE O/P NEW LOW 30 MIN: CPT

## 2024-01-31 NOTE — HISTORY OF PRESENT ILLNESS
[Home] : at home, [unfilled] , at the time of the visit. [Medical Office: ([facility])___] : at the medical office located in  [Verbal consent obtained from patient] : the patient, [unfilled] [FreeTextEntry1] : This is a 38 year old woman with a long history of anxiety and POTS. She notes an initial diagnosis several years ago when she first began noticing symptoms of a racing heart and dizziness when she arose from a seated position and began exerting herself. She was sent for tilt table testing which was also suggestive of the diagnosis. She was begun on beta blockers which caused fatigue and low BP, but on 12.5mg BID she noted improvement in symptoms for several years. Over the past several months, however, she has noted a return of symptoms and is more symptomatic. This was roughly coincident with the birth of her first child. Since delivery she has also complained of frequent issues with her vision, currently under evaluation with neurology and ophthalmology.   Prior TTE showed a structurally normal heart and event monitoring showed normal resting/sleeping heart rates with (presmued) exertional rates in the 100s-120s bpm.   She notes treatment several years ago with Zoloft for anxiety/depression under the consultation of a therapist and experienced adverse side effects on the drug. She is thus not interested in trialing an SSRI. She is also reluctant to try a calcium channel blocker due to the potential for further low blood pressure. She has no history of syncope.

## 2024-01-31 NOTE — PLAN
[FreeTextEntry1] : This is a 38 year old woman with a relapse of POTS symptoms in the context of recent child birth.  I reassured her that her current symptoms are likely occurring in the context of increased stress levels and, as previously, will very likely moderate in the coming months. Unfortuntely there is little additional medical therapy if she is unwilling to be retrialed with either calcium channel blockers or SSRIs. I encouraged regular exercise, good sleep habits, and stress relief activities. She has an appointment with an acupunturist, which I encouraged she pursue.  She appeared to understand the whole discussion and verbalized that all of her questions were answered to her satisfaction.  Thank you for allowing me to be involved in the care of this pleasant woman. Please feel free to contact me with any questions.   Adria Cowan MD  of Cardiology Electrophysiology Section [address] [place] [address] Office: [phone] Fax: [phone]

## 2024-04-25 ENCOUNTER — APPOINTMENT (OUTPATIENT)
Dept: ELECTROPHYSIOLOGY | Facility: CLINIC | Age: 39
End: 2024-04-25

## 2024-06-11 NOTE — ED ADULT TRIAGE NOTE - WEIGHT IN LBS

## 2024-06-20 ENCOUNTER — OFFICE (OUTPATIENT)
Dept: URBAN - METROPOLITAN AREA CLINIC 104 | Facility: CLINIC | Age: 39
Setting detail: OPHTHALMOLOGY
End: 2024-06-20
Payer: COMMERCIAL

## 2024-06-20 DIAGNOSIS — H53.19: ICD-10-CM

## 2024-06-20 PROCEDURE — 92012 INTRM OPH EXAM EST PATIENT: CPT | Performed by: OPTOMETRIST

## 2024-06-20 ASSESSMENT — CONFRONTATIONAL VISUAL FIELD TEST (CVF)
OD_FINDINGS: FULL
OS_FINDINGS: FULL